# Patient Record
Sex: FEMALE | Race: WHITE | NOT HISPANIC OR LATINO | Employment: FULL TIME | URBAN - NONMETROPOLITAN AREA
[De-identification: names, ages, dates, MRNs, and addresses within clinical notes are randomized per-mention and may not be internally consistent; named-entity substitution may affect disease eponyms.]

---

## 2020-10-03 ENCOUNTER — APPOINTMENT (EMERGENCY)
Dept: RADIOLOGY | Facility: HOSPITAL | Age: 55
End: 2020-10-03
Payer: COMMERCIAL

## 2020-10-03 ENCOUNTER — HOSPITAL ENCOUNTER (EMERGENCY)
Facility: HOSPITAL | Age: 55
Discharge: HOME | End: 2020-10-03
Attending: EMERGENCY MEDICINE | Admitting: EMERGENCY MEDICINE
Payer: COMMERCIAL

## 2020-10-03 VITALS
WEIGHT: 246 LBS | OXYGEN SATURATION: 99 % | SYSTOLIC BLOOD PRESSURE: 174 MMHG | RESPIRATION RATE: 18 BRPM | DIASTOLIC BLOOD PRESSURE: 83 MMHG | TEMPERATURE: 97.7 F | HEART RATE: 86 BPM
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
## 2020-10-03 DIAGNOSIS — S22.31XA FRACTURE OF ONE RIB OF RIGHT SIDE: ICD-10-CM

## 2020-10-03 PROCEDURE — 99284 EMERGENCY DEPT VISIT MOD MDM: CPT | Performed by: EMERGENCY MEDICINE

## 2020-10-03 PROCEDURE — 99283 EMERGENCY DEPT VISIT LOW MDM: CPT

## 2020-10-03 PROCEDURE — 96374 THER/PROPH/DIAG INJ IV PUSH: CPT

## 2020-10-03 PROCEDURE — 71046 X-RAY EXAM CHEST 2 VIEWS: CPT

## 2020-10-03 PROCEDURE — 72100 X-RAY EXAM L-S SPINE 2/3 VWS: CPT

## 2020-10-03 RX ORDER — OXYCODONE HYDROCHLORIDE AND ACETAMINOPHEN 5; 325 MG/1; MG/1
1 TABLET ORAL EVERY 6 HOURS PRN
Qty: 15 TABLET | Refills: 0 | Status: SHIPPED | OUTPATIENT
Start: 2020-10-03 | End: 2020-10-13

## 2020-10-03 RX ORDER — OXYCODONE HYDROCHLORIDE AND ACETAMINOPHEN 5; 325 MG/1; MG/1
1 TABLET ORAL ONCE
Status: COMPLETED | OUTPATIENT
Start: 2020-10-03 | End: 2020-10-03

## 2020-10-03 RX ORDER — KETOROLAC TROMETHAMINE 30 MG/ML
15 INJECTION, SOLUTION INTRAMUSCULAR; INTRAVENOUS ONCE
Status: COMPLETED | OUTPATIENT
Start: 2020-10-03 | End: 2020-10-03

## 2020-10-03 RX ADMIN — OXYCODONE HYDROCHLORIDE AND ACETAMINOPHEN 1 TABLET: 5; 325 TABLET ORAL at 21:26

## 2020-10-03 RX ADMIN — KETOROLAC TROMETHAMINE 15 MG: 30 INJECTION, SOLUTION INTRAMUSCULAR at 20:27

## 2021-03-02 ENCOUNTER — TRANSCRIBE ORDERS (OUTPATIENT)
Dept: ADMINISTRATIVE | Facility: HOSPITAL | Age: 56
End: 2021-03-02

## 2021-03-02 DIAGNOSIS — M79.662 PAIN IN LEFT LOWER LEG: ICD-10-CM

## 2021-03-02 DIAGNOSIS — I80.202: Primary | ICD-10-CM

## 2021-03-03 ENCOUNTER — APPOINTMENT (EMERGENCY)
Dept: RADIOLOGY | Facility: HOSPITAL | Age: 56
End: 2021-03-03
Payer: COMMERCIAL

## 2021-03-03 ENCOUNTER — APPOINTMENT (EMERGENCY)
Dept: CT IMAGING | Facility: HOSPITAL | Age: 56
End: 2021-03-03
Payer: COMMERCIAL

## 2021-03-03 ENCOUNTER — APPOINTMENT (OUTPATIENT)
Dept: NON INVASIVE DIAGNOSTICS | Facility: HOSPITAL | Age: 56
End: 2021-03-03
Payer: COMMERCIAL

## 2021-03-03 ENCOUNTER — HOSPITAL ENCOUNTER (OUTPATIENT)
Dept: NON INVASIVE DIAGNOSTICS | Facility: HOSPITAL | Age: 56
Discharge: HOME | End: 2021-03-03
Payer: COMMERCIAL

## 2021-03-03 ENCOUNTER — HOSPITAL ENCOUNTER (OUTPATIENT)
Facility: HOSPITAL | Age: 56
Setting detail: OBSERVATION
Discharge: HOME | End: 2021-03-04
Attending: EMERGENCY MEDICINE | Admitting: FAMILY MEDICINE
Payer: COMMERCIAL

## 2021-03-03 DIAGNOSIS — I80.202: ICD-10-CM

## 2021-03-03 DIAGNOSIS — I80.02 THROMBOPHLEBITIS OF SUPERFICIAL VEINS OF LEFT LOWER EXTREMITY: ICD-10-CM

## 2021-03-03 DIAGNOSIS — I26.99 ACUTE PULMONARY EMBOLISM WITHOUT ACUTE COR PULMONALE, UNSPECIFIED PULMONARY EMBOLISM TYPE (HCC): ICD-10-CM

## 2021-03-03 DIAGNOSIS — M79.605 LEFT LEG PAIN: Primary | ICD-10-CM

## 2021-03-03 DIAGNOSIS — M79.662 PAIN IN LEFT LOWER LEG: ICD-10-CM

## 2021-03-03 DIAGNOSIS — I82.90 OCCLUSIVE THROMBUS: ICD-10-CM

## 2021-03-03 DIAGNOSIS — I26.99 PULMONARY EMBOLISM (HCC): ICD-10-CM

## 2021-03-03 PROBLEM — E66.01 CLASS 2 SEVERE OBESITY DUE TO EXCESS CALORIES WITH SERIOUS COMORBIDITY IN ADULT (HCC): Status: ACTIVE | Noted: 2021-03-03

## 2021-03-03 PROBLEM — I83.812 VARICOSE VEINS OF LEFT LOWER EXTREMITY WITH PAIN: Status: ACTIVE | Noted: 2021-03-03

## 2021-03-03 PROBLEM — Z98.84 H/O BARIATRIC SURGERY: Status: ACTIVE | Noted: 2021-03-03

## 2021-03-03 PROBLEM — E66.812 CLASS 2 SEVERE OBESITY DUE TO EXCESS CALORIES WITH SERIOUS COMORBIDITY IN ADULT (HCC): Status: ACTIVE | Noted: 2021-03-03

## 2021-03-03 PROBLEM — R06.02 SHORTNESS OF BREATH: Status: ACTIVE | Noted: 2021-03-03

## 2021-03-03 LAB
ALBUMIN SERPL BCP-MCNC: 3.5 G/DL (ref 3.5–5)
ALP SERPL-CCNC: 67 U/L (ref 46–116)
ALT SERPL W P-5'-P-CCNC: 18 U/L (ref 12–78)
ANION GAP SERPL CALCULATED.3IONS-SCNC: 4 MMOL/L (ref 4–13)
APTT PPP: 176 SECONDS (ref 23–37)
APTT PPP: 27 SECONDS (ref 23–37)
AST SERPL W P-5'-P-CCNC: 12 U/L (ref 5–45)
BASOPHILS # BLD AUTO: 0.06 THOUSANDS/ΜL (ref 0–0.1)
BASOPHILS NFR BLD AUTO: 1 % (ref 0–1)
BILIRUB SERPL-MCNC: 0.73 MG/DL (ref 0.2–1)
BUN SERPL-MCNC: 18 MG/DL (ref 5–25)
CALCIUM SERPL-MCNC: 8.9 MG/DL (ref 8.3–10.1)
CHLORIDE SERPL-SCNC: 103 MMOL/L (ref 100–108)
CO2 SERPL-SCNC: 33 MMOL/L (ref 21–32)
CREAT SERPL-MCNC: 0.79 MG/DL (ref 0.6–1.3)
D DIMER PPP FEU-MCNC: 4.65 UG/ML FEU
DEPRECATED AT III PPP: 99 % OF NORMAL (ref 92–136)
EOSINOPHIL # BLD AUTO: 0.19 THOUSAND/ΜL (ref 0–0.61)
EOSINOPHIL NFR BLD AUTO: 2 % (ref 0–6)
ERYTHROCYTE [DISTWIDTH] IN BLOOD BY AUTOMATED COUNT: 13.2 % (ref 11.6–15.1)
GFR SERPL CREATININE-BSD FRML MDRD: 85 ML/MIN/1.73SQ M
GLUCOSE SERPL-MCNC: 154 MG/DL (ref 65–140)
HCT VFR BLD AUTO: 40.2 % (ref 34.8–46.1)
HGB BLD-MCNC: 13.2 G/DL (ref 11.5–15.4)
IMM GRANULOCYTES # BLD AUTO: 0.03 THOUSAND/UL (ref 0–0.2)
IMM GRANULOCYTES NFR BLD AUTO: 0 % (ref 0–2)
INR PPP: 1.02 (ref 0.84–1.19)
LYMPHOCYTES # BLD AUTO: 2.68 THOUSANDS/ΜL (ref 0.6–4.47)
LYMPHOCYTES NFR BLD AUTO: 33 % (ref 14–44)
MCH RBC QN AUTO: 29.9 PG (ref 26.8–34.3)
MCHC RBC AUTO-ENTMCNC: 32.8 G/DL (ref 31.4–37.4)
MCV RBC AUTO: 91 FL (ref 82–98)
MONOCYTES # BLD AUTO: 0.6 THOUSAND/ΜL (ref 0.17–1.22)
MONOCYTES NFR BLD AUTO: 7 % (ref 4–12)
NEUTROPHILS # BLD AUTO: 4.62 THOUSANDS/ΜL (ref 1.85–7.62)
NEUTS SEG NFR BLD AUTO: 57 % (ref 43–75)
NRBC BLD AUTO-RTO: 0 /100 WBCS
PLATELET # BLD AUTO: 298 THOUSANDS/UL (ref 149–390)
PMV BLD AUTO: 9.6 FL (ref 8.9–12.7)
POTASSIUM SERPL-SCNC: 4.3 MMOL/L (ref 3.5–5.3)
PROT SERPL-MCNC: 7.3 G/DL (ref 6.4–8.2)
PROTHROMBIN TIME: 13.2 SECONDS (ref 11.6–14.5)
RBC # BLD AUTO: 4.41 MILLION/UL (ref 3.81–5.12)
SODIUM SERPL-SCNC: 140 MMOL/L (ref 136–145)
TROPONIN I SERPL-MCNC: <0.02 NG/ML
TSH SERPL DL<=0.05 MIU/L-ACNC: 2.35 UIU/ML (ref 0.36–3.74)
WBC # BLD AUTO: 8.18 THOUSAND/UL (ref 4.31–10.16)

## 2021-03-03 PROCEDURE — 86147 CARDIOLIPIN ANTIBODY EA IG: CPT | Performed by: EMERGENCY MEDICINE

## 2021-03-03 PROCEDURE — 85730 THROMBOPLASTIN TIME PARTIAL: CPT | Performed by: FAMILY MEDICINE

## 2021-03-03 PROCEDURE — 94760 N-INVAS EAR/PLS OXIMETRY 1: CPT

## 2021-03-03 PROCEDURE — 84443 ASSAY THYROID STIM HORMONE: CPT | Performed by: FAMILY MEDICINE

## 2021-03-03 PROCEDURE — 94664 DEMO&/EVAL PT USE INHALER: CPT

## 2021-03-03 PROCEDURE — 85670 THROMBIN TIME PLASMA: CPT | Performed by: EMERGENCY MEDICINE

## 2021-03-03 PROCEDURE — 85379 FIBRIN DEGRADATION QUANT: CPT | Performed by: EMERGENCY MEDICINE

## 2021-03-03 PROCEDURE — 81241 F5 GENE: CPT | Performed by: EMERGENCY MEDICINE

## 2021-03-03 PROCEDURE — 85306 CLOT INHIBIT PROT S FREE: CPT | Performed by: EMERGENCY MEDICINE

## 2021-03-03 PROCEDURE — 85732 THROMBOPLASTIN TIME PARTIAL: CPT | Performed by: EMERGENCY MEDICINE

## 2021-03-03 PROCEDURE — 86146 BETA-2 GLYCOPROTEIN ANTIBODY: CPT | Performed by: EMERGENCY MEDICINE

## 2021-03-03 PROCEDURE — 85610 PROTHROMBIN TIME: CPT | Performed by: FAMILY MEDICINE

## 2021-03-03 PROCEDURE — 93971 EXTREMITY STUDY: CPT

## 2021-03-03 PROCEDURE — 93005 ELECTROCARDIOGRAM TRACING: CPT

## 2021-03-03 PROCEDURE — 85305 CLOT INHIBIT PROT S TOTAL: CPT | Performed by: EMERGENCY MEDICINE

## 2021-03-03 PROCEDURE — 99285 EMERGENCY DEPT VISIT HI MDM: CPT | Performed by: EMERGENCY MEDICINE

## 2021-03-03 PROCEDURE — 80053 COMPREHEN METABOLIC PANEL: CPT

## 2021-03-03 PROCEDURE — 71275 CT ANGIOGRAPHY CHEST: CPT

## 2021-03-03 PROCEDURE — 93971 EXTREMITY STUDY: CPT | Performed by: SURGERY

## 2021-03-03 PROCEDURE — 71045 X-RAY EXAM CHEST 1 VIEW: CPT

## 2021-03-03 PROCEDURE — 93306 TTE W/DOPPLER COMPLETE: CPT | Performed by: INTERNAL MEDICINE

## 2021-03-03 PROCEDURE — 84484 ASSAY OF TROPONIN QUANT: CPT

## 2021-03-03 PROCEDURE — 85705 THROMBOPLASTIN INHIBITION: CPT | Performed by: EMERGENCY MEDICINE

## 2021-03-03 PROCEDURE — 85025 COMPLETE CBC W/AUTO DIFF WBC: CPT

## 2021-03-03 PROCEDURE — 36415 COLL VENOUS BLD VENIPUNCTURE: CPT

## 2021-03-03 PROCEDURE — 99220 PR INITIAL OBSERVATION CARE/DAY 70 MINUTES: CPT | Performed by: FAMILY MEDICINE

## 2021-03-03 PROCEDURE — 85303 CLOT INHIBIT PROT C ACTIVITY: CPT | Performed by: EMERGENCY MEDICINE

## 2021-03-03 PROCEDURE — 81240 F2 GENE: CPT | Performed by: EMERGENCY MEDICINE

## 2021-03-03 PROCEDURE — 85300 ANTITHROMBIN III ACTIVITY: CPT | Performed by: EMERGENCY MEDICINE

## 2021-03-03 PROCEDURE — 85613 RUSSELL VIPER VENOM DILUTED: CPT | Performed by: EMERGENCY MEDICINE

## 2021-03-03 PROCEDURE — 99285 EMERGENCY DEPT VISIT HI MDM: CPT

## 2021-03-03 PROCEDURE — 93306 TTE W/DOPPLER COMPLETE: CPT

## 2021-03-03 RX ORDER — CALCIUM CARBONATE 200(500)MG
1000 TABLET,CHEWABLE ORAL DAILY PRN
Status: DISCONTINUED | OUTPATIENT
Start: 2021-03-03 | End: 2021-03-04 | Stop reason: HOSPADM

## 2021-03-03 RX ORDER — ONDANSETRON 2 MG/ML
4 INJECTION INTRAMUSCULAR; INTRAVENOUS EVERY 6 HOURS PRN
Status: DISCONTINUED | OUTPATIENT
Start: 2021-03-03 | End: 2021-03-04 | Stop reason: HOSPADM

## 2021-03-03 RX ORDER — FEXOFENADINE HCL 180 MG/1
180 TABLET ORAL
COMMUNITY

## 2021-03-03 RX ORDER — POLYETHYLENE GLYCOL 3350 17 G/17G
17 POWDER, FOR SOLUTION ORAL DAILY
Status: DISCONTINUED | OUTPATIENT
Start: 2021-03-03 | End: 2021-03-04 | Stop reason: HOSPADM

## 2021-03-03 RX ORDER — ERGOCALCIFEROL 1.25 MG/1
CAPSULE ORAL
COMMUNITY
Start: 2021-02-05

## 2021-03-03 RX ORDER — SENNOSIDES 8.6 MG
1 TABLET ORAL DAILY
Status: DISCONTINUED | OUTPATIENT
Start: 2021-03-03 | End: 2021-03-04 | Stop reason: HOSPADM

## 2021-03-03 RX ORDER — ACETAMINOPHEN 325 MG/1
650 TABLET ORAL EVERY 6 HOURS PRN
Status: DISCONTINUED | OUTPATIENT
Start: 2021-03-03 | End: 2021-03-04 | Stop reason: HOSPADM

## 2021-03-03 RX ORDER — HEPARIN SODIUM 10000 [USP'U]/100ML
3-30 INJECTION, SOLUTION INTRAVENOUS
Status: DISCONTINUED | OUTPATIENT
Start: 2021-03-03 | End: 2021-03-04

## 2021-03-03 RX ORDER — BIOTIN 10 MG
2 TABLET ORAL
COMMUNITY

## 2021-03-03 RX ORDER — HEPARIN SODIUM 1000 [USP'U]/ML
8800 INJECTION, SOLUTION INTRAVENOUS; SUBCUTANEOUS ONCE
Status: COMPLETED | OUTPATIENT
Start: 2021-03-03 | End: 2021-03-03

## 2021-03-03 RX ADMIN — IOHEXOL 100 ML: 350 INJECTION, SOLUTION INTRAVENOUS at 10:50

## 2021-03-03 RX ADMIN — HEPARIN SODIUM 18 UNITS/KG/HR: 10000 INJECTION, SOLUTION INTRAVENOUS at 11:55

## 2021-03-03 RX ADMIN — HEPARIN SODIUM 8800 UNITS: 1000 INJECTION INTRAVENOUS; SUBCUTANEOUS at 11:55

## 2021-03-03 NOTE — ED PROVIDER NOTES
History  Chief Complaint   Patient presents with    Leg Pain     pt c/o increased left leg pain, redness, and swelling from knee to calf for past week and developed sob over past couple days  hx DVT  denies travel/fevers/cough     52-year-old female to the emergency room complaining of shortness of breath for the last couple days  Patient reports that for about a week now she has been experiencing left leg pain, redness, swelling which is also been getting increasingly worse  Patient has a history of DVT secondary to previous pregnancies and had previously been on anticoagulation therapy  Not currently  Patient became more concerned when she started to feel short of breath today  No chest pressure  History provided by:  Patient  Leg Pain  Location:  Leg  Time since incident:  5 days  Leg location:  L leg and L lower leg  Pain details:     Quality:  Aching, cramping and shooting    Radiates to:  L leg    Severity:  Moderate    Onset quality:  Gradual    Timing:  Constant    Progression:  Worsening  Chronicity:  New  Associated symptoms: no back pain and no fever    Shortness of Breath  Severity:  Moderate  Onset quality:  Gradual  Duration:  2 days  Timing:  Intermittent  Progression:  Worsening  Chronicity:  New  Context: activity    Worsened by: Activity, deep breathing, exertion and stress  Ineffective treatments:  None tried  Associated symptoms: no abdominal pain, no chest pain, no cough, no diaphoresis, no ear pain, no fever, no headaches, no rash, no sore throat, no vomiting and no wheezing        Prior to Admission Medications   Prescriptions Last Dose Informant Patient Reported? Taking?    Multiple Vitamins-Minerals (Multivitamin Adult Extra C) CHEW 3/2/2021 at Unknown time  Yes Yes   Sig: Chew 2 each   ergocalciferol (VITAMIN D2) 50,000 units Past Week at Unknown time  Yes Yes   Sig: TAKE 2 CAPSULES BY MOUTH ONCE A WEEK   fexofenadine (ALLEGRA) 180 MG tablet 3/2/2021 at Unknown time  Yes Yes Sig: Take 180 mg by mouth      Facility-Administered Medications: None       Past Medical History:   Diagnosis Date    DVT (deep vein thrombosis) in pregnancy        Past Surgical History:   Procedure Laterality Date    ABDOMINAL SURGERY      Gastric Sleeve      SECTION         History reviewed  No pertinent family history  I have reviewed and agree with the history as documented  E-Cigarette/Vaping    E-Cigarette Use Never User      E-Cigarette/Vaping Substances     Social History     Tobacco Use    Smoking status: Former Smoker     Types: Cigarettes     Quit date:      Years since quittin 1    Smokeless tobacco: Never Used   Substance Use Topics    Alcohol use: Never     Frequency: Never    Drug use: Never       Review of Systems   Constitutional: Negative for activity change, diaphoresis and fever  HENT: Negative for congestion, ear pain, rhinorrhea, sinus pressure and sore throat  Eyes: Negative  Respiratory: Positive for shortness of breath  Negative for cough, chest tightness and wheezing  Cardiovascular: Negative for chest pain, palpitations and leg swelling  Gastrointestinal: Negative for abdominal pain, diarrhea, nausea and vomiting  Endocrine: Negative  Genitourinary: Negative for decreased urine volume, dysuria and flank pain  Musculoskeletal: Negative for arthralgias, back pain and myalgias  Skin: Negative for color change, pallor and rash  Allergic/Immunologic: Negative  Neurological: Negative for dizziness, weakness and headaches  Hematological: Negative  Psychiatric/Behavioral: Negative  All other systems reviewed and are negative  Physical Exam  Physical Exam  Vitals signs and nursing note reviewed  Constitutional:       General: She is not in acute distress  Appearance: She is well-developed  She is obese  She is not ill-appearing or toxic-appearing  HENT:      Head: Normocephalic and atraumatic        Right Ear: External ear normal       Left Ear: External ear normal       Nose: Nose normal  No congestion  Mouth/Throat:      Mouth: Mucous membranes are moist       Pharynx: Oropharynx is clear  No oropharyngeal exudate  Eyes:      Pupils: Pupils are equal, round, and reactive to light  Neck:      Musculoskeletal: Normal range of motion and neck supple  Cardiovascular:      Rate and Rhythm: Normal rate and regular rhythm  Heart sounds: Normal heart sounds  No murmur  Pulmonary:      Effort: Pulmonary effort is normal  No respiratory distress  Breath sounds: Normal breath sounds  No stridor  No wheezing or rales  Chest:      Chest wall: No tenderness  Abdominal:      General: Abdomen is flat  There is no distension  Palpations: Abdomen is soft  Tenderness: There is no abdominal tenderness  There is no right CVA tenderness, left CVA tenderness, guarding or rebound  Musculoskeletal: Normal range of motion  General: No deformity  Right lower leg: Edema present  Left lower leg: Edema present  Comments: Bilateral left greater than right varicosities  Bilateral nonpitting edema  Skin:     General: Skin is warm and dry  Coloration: Skin is not pale  Findings: No rash  Neurological:      General: No focal deficit present  Mental Status: She is alert and oriented to person, place, and time  Mental status is at baseline  Cranial Nerves: No cranial nerve deficit  Psychiatric:         Mood and Affect: Mood normal          Thought Content:  Thought content normal          Judgment: Judgment normal          Vital Signs  ED Triage Vitals [03/03/21 1014]   Temperature Pulse Respirations Blood Pressure SpO2   98 7 °F (37 1 °C) 74 17 132/76 100 %      Temp Source Heart Rate Source Patient Position - Orthostatic VS BP Location FiO2 (%)   Temporal Monitor Lying Left arm --      Pain Score       --           Vitals:    03/03/21 1014 03/03/21 1045 03/03/21 1130   BP: 132/76 118/64 127/78   Pulse: 74 69 68   Patient Position - Orthostatic VS: Lying           Visual Acuity      ED Medications  Medications   heparin (porcine) injection 8,800 Units (has no administration in time range)   heparin (porcine) 25,000 units in 0 45% NaCl 250 mL infusion (premix) (has no administration in time range)   iohexol (OMNIPAQUE) 350 MG/ML injection (SINGLE-DOSE) 100 mL (100 mL Intravenous Given 3/3/21 1050)       Diagnostic Studies  Results Reviewed     Procedure Component Value Units Date/Time    D-dimer, quantitative [[id number]]  (Abnormal) Collected: 03/03/21 1020    Lab Status: Final result Specimen: Blood from Arm, Right Updated: 03/03/21 1132     D-Dimer, Quant 4 65 ug/ml FEU     Troponin I [[id number]]  (Normal) Collected: 03/03/21 1020    Lab Status: Final result Specimen: Blood from Arm, Right Updated: 03/03/21 1047     Troponin I <0 02 ng/mL     Comprehensive metabolic panel [[id number]]  (Abnormal) Collected: 03/03/21 1020    Lab Status: Final result Specimen: Blood from Arm, Right Updated: 03/03/21 1044     Sodium 140 mmol/L      Potassium 4 3 mmol/L      Chloride 103 mmol/L      CO2 33 mmol/L      ANION GAP 4 mmol/L      BUN 18 mg/dL      Creatinine 0 79 mg/dL      Glucose 154 mg/dL      Calcium 8 9 mg/dL      AST 12 U/L      ALT 18 U/L      Alkaline Phosphatase 67 U/L      Total Protein 7 3 g/dL      Albumin 3 5 g/dL      Total Bilirubin 0 73 mg/dL      eGFR 85 ml/min/1 73sq m     Narrative:      [facility] guidelines for Chronic Kidney Disease (CKD):     Stage 1 with normal or high GFR (GFR > 90 mL/min/1 73 square meters)    Stage 2 Mild CKD (GFR = 60-89 mL/min/1 73 square meters)    Stage 3A Moderate CKD (GFR = 45-59 mL/min/1 73 square meters)    Stage 3B Moderate CKD (GFR = 30-44 mL/min/1 73 square meters)    Stage 4 Severe CKD (GFR = 15-29 mL/min/1 73 square meters)    Stage 5 End Stage CKD (GFR <15 mL/min/1 73 square meters)  Note: GFR calculation is accurate only with a steady state creatinine    CBC and differential [[id number]] Collected: 03/03/21 1020    Lab Status: Final result Specimen: Blood from Arm, Right Updated: 03/03/21 1026     WBC 8 18 Thousand/uL      RBC 4 41 Million/uL      Hemoglobin 13 2 g/dL      Hematocrit 40 2 %      MCV 91 fL      MCH 29 9 pg      MCHC 32 8 g/dL      RDW 13 2 %      MPV 9 6 fL      Platelets 077 Thousands/uL      nRBC 0 /100 WBCs      Neutrophils Relative 57 %      Immat GRANS % 0 %      Lymphocytes Relative 33 %      Monocytes Relative 7 %      Eosinophils Relative 2 %      Basophils Relative 1 %      Neutrophils Absolute 4 62 Thousands/µL      Immature Grans Absolute 0 03 Thousand/uL      Lymphocytes Absolute 2 68 Thousands/µL      Monocytes Absolute 0 60 Thousand/µL      Eosinophils Absolute 0 19 Thousand/µL      Basophils Absolute 0 06 Thousands/µL                  CTA ED chest PE Study   Final Result by Brittny Ley MD (03/03 1119)      Few small right-sided pulmonary emboli  Measured RV/LV ratio is within normal limits at less than 0 9  Clear lungs  I personally discussed this study with Randa Marte on 3/3/2021 at 11:13 AM                Workstation performed: [id number]         XR chest 1 view portable   Final Result by Jeanmarie Millard MD (03/03 1119)   No acute cardiopulmonary disease        Findings are stable            Workstation performed: [id number]                    Procedures  ECG 12 Lead Documentation Only    Date/Time: 3/3/2021 11:27 AM  Performed by: Mecca Doty DO  Authorized by: Mecca Doty DO     Indications / Diagnosis:  Shortness of breath  Patient location:  ED  Previous ECG:     Previous ECG:  Unavailable  Interpretation:     Interpretation: normal    Rate:     ECG rate assessment: normal    Rhythm:     Rhythm: sinus rhythm    Ectopy:     Ectopy: none    QRS:     QRS axis:  Normal  Conduction:     Conduction: normal    ST segments:     ST segments: Normal             ED Course                             SBIRT 20yo+      Most Recent Value   SBIRT (22 yo +)   In order to provide better care to our patients, we are screening all of our patients for alcohol and drug use  Would it be okay to ask you these screening questions? Yes Filed at: 03/03/2021 1034   Initial Alcohol Screen: US AUDIT-C    1  How often do you have a drink containing alcohol?  0 Filed at: 03/03/2021 1034   2  How many drinks containing alcohol do you have on a typical day you are drinking? 0 Filed at: 03/03/2021 1034   3b  FEMALE Any Age, or MALE 65+: How often do you have 4 or more drinks on one occassion? 0 Filed at: 03/03/2021 1034   Audit-C Score  0 Filed at: 03/03/2021 1034   OLAYINKA: How many times in the past year have you    Used an illegal drug or used a prescription medication for non-medical reasons? Never Filed at: 03/03/2021 1034          Wells' Criteria for PE      Most Recent Value   Wells' Criteria for PE   Clinical signs and symptoms of DVT  3 Filed at: 03/03/2021 1040   PE is primary diagnosis or equally likely  3 Filed at: 03/03/2021 1040   HR >100  0 Filed at: 03/03/2021 1040   Immobilization at least 3 days or Surgery in the previous 4 weeks  0 Filed at: 03/03/2021 1040   Previous, objectively diagnosed PE or DVT  1 5 Filed at: 03/03/2021 1040   Hemoptysis  0 Filed at: 03/03/2021 1040   Malignancy with treatment within 6 months or palliative  0 Filed at: 03/03/2021 1040   Wells' Criteria Total  7 5 Filed at: 03/03/2021 1040                MDM  Number of Diagnoses or Management Options  Left leg pain: new and requires workup  Pulmonary embolism ([address] ): new and requires workup  Thrombophlebitis of superficial veins of left lower extremity: new and requires workup  Diagnosis management comments: Patient presented to the emergency department and a MSE was performed   The patient was evaluated and diagnosed with acute left leg pain with findings of superficial thrombophlebitis and subsequently pulmonary embolism  Patient has a history of DVT with pregnancies and had previously been on anticoagulation many years ago  None currently  This is a new issue that will require additional planned work-up and treatment in a hospitalized setting  As may have been required as part of this evaluation, clinical laboratory test, radiology imaging and medical testing (I e  EKG) were ordered as necessitated by the patient's presentation  I independently reviewed these studies, imaging and testing  This patient's case is considered to be a considerable risk secondary to the above listed disease process and poses a threat to the patient's well-being and baseline function  Further in-patient diagnostic testing and management, which may include the administration of parenteral medications, is required           Amount and/or Complexity of Data Reviewed  Clinical lab tests: ordered and reviewed  Tests in the radiology section of CPT®: ordered and reviewed  Discussion of test results with the performing providers: yes    Risk of Complications, Morbidity, and/or Mortality  Presenting problems: high  Diagnostic procedures: moderate  Management options: moderate    Patient Progress  Patient progress: stable      Disposition  Final diagnoses:   Left leg pain   Thrombophlebitis of superficial veins of left lower extremity   Pulmonary embolism ([address] )     Time reflects when diagnosis was documented in both MDM as applicable and the Disposition within this note     Time User Action Codes Description Comment    3/3/2021 11:32 AM Jl Gaviria Add [M79 605] Left leg pain     3/3/2021 11:33 AM Kristy Shook Add [I80 02] Thrombophlebitis of superficial veins of left lower extremity     3/3/2021 11:33 AM Jl Gaviria Add [I26 99] Pulmonary embolism [facility])       ED Disposition     ED Disposition Condition Date/Time Comment    Admit Stable Wed Mar 3, 2021 11:32 AM       Follow-up Information    None Patient's Medications   Discharge Prescriptions    No medications on file     No discharge procedures on file      PDMP Review     None          ED Provider  Electronically Signed by           Sara Pierre,   03/03/21 1294

## 2021-03-03 NOTE — ASSESSMENT & PLAN NOTE
Most likely secondary to pulmonary embolism  Continue treatment as per protocol with heparin drip  Follow echocardiogram  Follow respiratory protocol

## 2021-03-03 NOTE — ASSESSMENT & PLAN NOTE
Lifestyle modification  Patient had history of gastric sleeve surgery in 2017  Low-fat, low-salt diet follow nutritional consult

## 2021-03-03 NOTE — RESPIRATORY THERAPY NOTE
RT Protocol Note  Jace Smiley 55 y o  female MRN: [id number]  Unit/Bed#: -01 Encounter: [id number]    Assessment    Principal Problem:    Acute pulmonary embolism without acute cor pulmonale (HCC)  Active Problems:    Shortness of breath    Class 2 severe obesity due to excess calories with serious comorbidity in adult [facility])    Occlusive thrombus    H/O bariatric surgery    Varicose veins of left lower extremity with pain      Home Pulmonary Medications:         Past Medical History:   Diagnosis Date    DVT (deep vein thrombosis) in pregnancy      Social History     Socioeconomic History    Marital status: /Civil Union     Spouse name: None    Number of children: None    Years of education: None    Highest education level: None   Occupational History    None   Social Needs    Financial resource strain: None    Food insecurity     Worry: None     Inability: None    Transportation needs     Medical: None     Non-medical: None   Tobacco Use    Smoking status: Former Smoker     Types: Cigarettes     Quit date:      Years since quittin 1    Smokeless tobacco: Never Used   Substance and Sexual Activity    Alcohol use: Never     Frequency: Never    Drug use: Never    Sexual activity: None   Lifestyle    Physical activity     Days per week: None     Minutes per session: None    Stress: None   Relationships    Social connections     Talks on phone: None     Gets together: None     Attends Gnosticist service: None     Active member of club or organization: None     Attends meetings of clubs or organizations: None     Relationship status: None    Intimate partner violence     Fear of current or ex partner: None     Emotionally abused: None     Physically abused: None     Forced sexual activity: None   Other Topics Concern    None   Social History Narrative    None       Subjective         Objective    Physical Exam:   Assessment Type: Assess only  General Appearance: Awake  Respiratory Pattern: Symmetrical, Normal  Chest Assessment: Chest expansion symmetrical  Bilateral Breath Sounds: Clear  O2 Device: room air    Vitals:  Blood pressure 134/75, pulse 76, temperature 98 1 °F (36 7 °C), temperature source Oral, resp  rate 16, height 5' 7" (1 702 m), weight 113 kg (248 lb 0 3 oz), SpO2 95 %  Imaging and other studies: I have personally reviewed pertinent reports  O2 Device: room air     Plan    Respiratory Plan: (P) Discontinue Protocol        Resp Comments: Pt admitted with SOB and leg swelling and redness  Chest xray shows right sided PE  Pt is stable on room air with no use of respiratory medication  clear BS bilaterally

## 2021-03-03 NOTE — ASSESSMENT & PLAN NOTE
CTA chest shows few small right-sided pulmonary embolism, RV/LV ratio normal,  Venous duplex shows:Acute superficial thrombophlebitis at the SFJ (non-occlusive) and in the entireGSV  Occlusive thrombus in the GSV is 1 02 cm from the CFV  Presented with shortness of breath  Patient also has previous history DVT while she was pregnant, and patient has risk factor obesity-and sedentary lifestyle (works 12 hour shift on sitting position)  Hemodynamically remained stable  Patient is not requiring any oxygen  Follow echocardiogram  Continue heparin drip for next 24 hours, then consider to switch to p o   Anticoagulation  Monitor for signs symptoms of bleeding

## 2021-03-03 NOTE — ASSESSMENT & PLAN NOTE
Venous duplex shows:Acute superficial thrombophlebitis at the SFJ (non-occlusive) and in the entireGSV  Occlusive thrombus in the GSV is 1 02 cm from the CFV    Discussed the case via tiger text with on-call vascular surgery-recommends to continue medical management

## 2021-03-03 NOTE — H&P
H&P- Herrera Brendakit 1965, 55 y o  female MRN: [id number]    Unit/Bed#: -01 Encounter: [id number]    Primary Care Provider: Suleiman Xiong DO   Date and time admitted to hospital: 3/3/2021 10:09 AM        * Acute pulmonary embolism without acute cor pulmonale (HCC)  Assessment & Plan  CTA chest shows few small right-sided pulmonary embolism, RV/LV ratio normal,  Venous duplex shows:Acute superficial thrombophlebitis at the SFJ (non-occlusive) and in the entireGSV  Occlusive thrombus in the GSV is 1 02 cm from the CFV  Presented with shortness of breath  Patient also has previous history DVT while she was pregnant, and patient has risk factor obesity-and sedentary lifestyle (works 12 hour shift on sitting position)  Hemodynamically remained stable  Patient is not requiring any oxygen  Follow echocardiogram  Continue heparin drip for next 24 hours, then consider to switch to p o  Anticoagulation  Monitor for signs symptoms of bleeding    Varicose veins of left lower extremity with pain  Assessment & Plan  Affecting left lower extremities  May use Tylenol    H/O bariatric surgery  Assessment & Plan  History of gastric sleeve surgery in 2017      Occlusive thrombus  Assessment & Plan  Venous duplex shows:Acute superficial thrombophlebitis at the SFJ (non-occlusive) and in the entireGSV  Occlusive thrombus in the GSV is 1 02 cm from the CFV    Discussed the case via tiger text with on-call vascular surgery-recommends to continue medical management    Class 2 severe obesity due to excess calories with serious comorbidity in adult [facility])  Assessment & Plan  Lifestyle modification  Patient had history of gastric sleeve surgery in 2017  Low-fat, low-salt diet follow nutritional consult    Shortness of breath  Assessment & Plan  Most likely secondary to pulmonary embolism  Continue treatment as per protocol with heparin drip  Follow echocardiogram  Follow respiratory protocol    VTE Prophylaxis: Heparin Drip  / sequential compression device   Code Status:  Full code      Anticipated Length of Stay:  Patient will be admitted on an Observation basis with an anticipated length of stay of  < 2 midnights  Justification for Hospital Stay:  To monitor above conditions    Total Time for Visit, including Counseling / Coordination of Care: 45 minutes  Greater than 50% of this total time spent on direct patient counseling and coordination of care  Chief Complaint:   Shortness of breath    History of Present Illness:    Neil Bailey is a 55 y o  female who presents with leg pain and shortness of breath  As per patient, for last 1 week, she was having pain in the left lower extremities which was getting worse, then later on she was started having short of breath especially she when she was awake  Denies any bloody sputum, fever, runny nose, chest pain, dizziness, lightheadedness  Does not smoke or drink alcohol  Patient reports she had history of gastric sleeve surgery in 2017  Also had history of DVT while she was pregnant, received heparin short  Patient reports she works 12 hour shift, sitting position at home  Denies any recent traveling, any recent surgery  But patient does have varicose vein affecting lower extremities    Review of Systems:    Review of Systems   Constitutional: Positive for activity change  Negative for appetite change, chills, diaphoresis, fatigue, fever and unexpected weight change  HENT: Negative for congestion  Respiratory: Positive for shortness of breath  Negative for apnea, cough, wheezing and stridor  Cardiovascular: Positive for leg swelling  Negative for chest pain and palpitations  Gastrointestinal: Negative for abdominal distention, abdominal pain, diarrhea, nausea, rectal pain and vomiting  Genitourinary: Negative for difficulty urinating and dyspareunia  Musculoskeletal: Positive for arthralgias and back pain  Skin: Negative for color change and wound  Neurological: Negative for dizziness, facial asymmetry, light-headedness and headaches  Hematological: Negative for adenopathy  Psychiatric/Behavioral: Negative for agitation and behavioral problems  All other systems reviewed and are negative  Past Medical and Surgical History:     Past Medical History:   Diagnosis Date    DVT (deep vein thrombosis) in pregnancy        Past Surgical History:   Procedure Laterality Date    ABDOMINAL SURGERY      Gastric Sleeve      SECTION         Meds/Allergies:    Prior to Admission medications    Medication Sig Start Date End Date Taking? Authorizing Provider   ergocalciferol (VITAMIN D2) 50,000 units TAKE 2 CAPSULES BY MOUTH ONCE A WEEK 21  Yes Historical Provider, MD   fexofenadine (ALLEGRA) 180 MG tablet Take 180 mg by mouth   Yes Historical Provider, MD   Multiple Vitamins-Minerals (Multivitamin Adult Extra C) CHEW Chew 2 each   Yes Historical Provider, MD     I have reviewed home medications with patient personally      Allergies: No Known Allergies    Social History:     Marital Status: /Civil Union   Occupation:  Works from home  Patient Pre-hospital Living Situation:  At home  Patient Pre-hospital Level of Mobility:  Independent  Patient Pre-hospital Diet Restrictions:  No restrictions  Substance Use History:   Social History     Substance and Sexual Activity   Alcohol Use Never    Frequency: Never     Social History     Tobacco Use   Smoking Status Former Smoker    Types: Cigarettes    Quit date:     Years since quittin 1   Smokeless Tobacco Never Used     Social History     Substance and Sexual Activity   Drug Use Never       Family History:    non-contributory    Physical Exam:     Vitals:   Blood Pressure: 138/97 (21 1215)  Pulse: 64 (21 1215)  Temperature: 98 °F (36 7 °C) (21 1215)  Temp Source: Temporal (21 1014)  Respirations: 16 (21 1215)  Height: 5' 7" (170 2 cm) (21 1014)  Weight - Scale: 113 kg (248 lb 0 3 oz) (03/03/21 1014)  SpO2: 98 % (03/03/21 1215)    Physical Exam  Vitals signs and nursing note reviewed  Exam conducted with a chaperone present  Constitutional:       Appearance: She is obese  She is not diaphoretic  HENT:      Nose: No congestion  Mouth/Throat:      Pharynx: Oropharynx is clear  No oropharyngeal exudate  Eyes:      General: No scleral icterus  Conjunctiva/sclera: Conjunctivae normal       Pupils: Pupils are equal, round, and reactive to light  Neck:      Musculoskeletal: Normal range of motion  Cardiovascular:      Rate and Rhythm: Normal rate  Heart sounds: No gallop  Pulmonary:      Effort: Pulmonary effort is normal  No respiratory distress  Breath sounds: No wheezing, rhonchi or rales  Abdominal:      General: Abdomen is flat  Bowel sounds are normal  There is no distension  Palpations: There is no mass  Tenderness: There is no abdominal tenderness  Hernia: No hernia is present  Musculoskeletal:      Comments: Varicose vein affecting left lower extremities   Lymphadenopathy:      Cervical: No cervical adenopathy  Skin:     Findings: No erythema  Neurological:      Mental Status: She is alert and oriented to person, place, and time  Cranial Nerves: No cranial nerve deficit  Sensory: No sensory deficit  Motor: No weakness  Coordination: Coordination normal    Psychiatric:         Mood and Affect: Mood normal          Additional Data:     Lab Results: I have personally reviewed pertinent reports        Results from last 7 days   Lab Units 03/03/21  1020   WBC Thousand/uL 8 18   HEMOGLOBIN g/dL 13 2   HEMATOCRIT % 40 2   PLATELETS Thousands/uL 298   NEUTROS PCT % 57   LYMPHS PCT % 33   MONOS PCT % 7   EOS PCT % 2     Results from last 7 days   Lab Units 03/03/21  1020   SODIUM mmol/L 140   POTASSIUM mmol/L 4 3   CHLORIDE mmol/L 103   CO2 mmol/L 33*   BUN mg/dL 18   CREATININE mg/dL 0 79   ANION GAP mmol/L 4   CALCIUM mg/dL 8 9   ALBUMIN g/dL 3 5   TOTAL BILIRUBIN mg/dL 0 73   ALK PHOS U/L 67   ALT U/L 18   AST U/L 12   GLUCOSE RANDOM mg/dL 154*     Results from last 7 days   Lab Units 03/03/21  1020   INR  1 02                   Imaging: I have personally reviewed pertinent reports  CTA ED chest PE Study   Final Result by Rosmrey Richardson MD (03/03 1119)      Few small right-sided pulmonary emboli  Measured RV/LV ratio is within normal limits at less than 0 9  Clear lungs  I personally discussed this study with Joan Her on 3/3/2021 at 11:13 AM                Workstation performed: [id number]         XR chest 1 view portable   Final Result by Mariela Maciel MD (03/03 1119)   No acute cardiopulmonary disease  Findings are stable            Workstation performed: [id number]             EKG, Pathology, and Other Studies Reviewed on Admission:   · EKG:  Normal sinus rhythm  Allscripts / Epic Records Reviewed: Yes     ** Please Note: This note has been constructed using a voice recognition system   **

## 2021-03-04 VITALS
OXYGEN SATURATION: 97 % | SYSTOLIC BLOOD PRESSURE: 128 MMHG | TEMPERATURE: 98 F | HEART RATE: 67 BPM | BODY MASS INDEX: 38.93 KG/M2 | WEIGHT: 248.02 LBS | HEIGHT: 67 IN | RESPIRATION RATE: 17 BRPM | DIASTOLIC BLOOD PRESSURE: 79 MMHG

## 2021-03-04 LAB
ALBUMIN SERPL BCP-MCNC: 3.3 G/DL (ref 3.5–5)
ALP SERPL-CCNC: 64 U/L (ref 46–116)
ALT SERPL W P-5'-P-CCNC: 16 U/L (ref 12–78)
ANION GAP SERPL CALCULATED.3IONS-SCNC: 8 MMOL/L (ref 4–13)
APTT PPP: 110 SECONDS (ref 23–37)
APTT PPP: 90 SECONDS (ref 23–37)
AST SERPL W P-5'-P-CCNC: 12 U/L (ref 5–45)
BASOPHILS # BLD AUTO: 0.06 THOUSANDS/ΜL (ref 0–0.1)
BASOPHILS NFR BLD AUTO: 1 % (ref 0–1)
BILIRUB SERPL-MCNC: 0.68 MG/DL (ref 0.2–1)
BUN SERPL-MCNC: 12 MG/DL (ref 5–25)
CALCIUM ALBUM COR SERPL-MCNC: 9.4 MG/DL (ref 8.3–10.1)
CALCIUM SERPL-MCNC: 8.8 MG/DL (ref 8.3–10.1)
CARDIOLIPIN IGA SER IA-ACNC: <9 APL U/ML (ref 0–11)
CARDIOLIPIN IGG SER IA-ACNC: <9 GPL U/ML (ref 0–14)
CARDIOLIPIN IGM SER IA-ACNC: <9 MPL U/ML (ref 0–12)
CHLORIDE SERPL-SCNC: 104 MMOL/L (ref 100–108)
CO2 SERPL-SCNC: 28 MMOL/L (ref 21–32)
CREAT SERPL-MCNC: 0.68 MG/DL (ref 0.6–1.3)
EOSINOPHIL # BLD AUTO: 0.2 THOUSAND/ΜL (ref 0–0.61)
EOSINOPHIL NFR BLD AUTO: 3 % (ref 0–6)
ERYTHROCYTE [DISTWIDTH] IN BLOOD BY AUTOMATED COUNT: 13 % (ref 11.6–15.1)
GFR SERPL CREATININE-BSD FRML MDRD: 99 ML/MIN/1.73SQ M
GLUCOSE SERPL-MCNC: 107 MG/DL (ref 65–140)
HCT VFR BLD AUTO: 39.4 % (ref 34.8–46.1)
HGB BLD-MCNC: 13.2 G/DL (ref 11.5–15.4)
IMM GRANULOCYTES # BLD AUTO: 0.01 THOUSAND/UL (ref 0–0.2)
IMM GRANULOCYTES NFR BLD AUTO: 0 % (ref 0–2)
LYMPHOCYTES # BLD AUTO: 4.51 THOUSANDS/ΜL (ref 0.6–4.47)
LYMPHOCYTES NFR BLD AUTO: 55 % (ref 14–44)
MCH RBC QN AUTO: 30.2 PG (ref 26.8–34.3)
MCHC RBC AUTO-ENTMCNC: 33.5 G/DL (ref 31.4–37.4)
MCV RBC AUTO: 90 FL (ref 82–98)
MONOCYTES # BLD AUTO: 0.44 THOUSAND/ΜL (ref 0.17–1.22)
MONOCYTES NFR BLD AUTO: 5 % (ref 4–12)
NEUTROPHILS # BLD AUTO: 2.91 THOUSANDS/ΜL (ref 1.85–7.62)
NEUTS SEG NFR BLD AUTO: 36 % (ref 43–75)
NRBC BLD AUTO-RTO: 0 /100 WBCS
PLATELET # BLD AUTO: 304 THOUSANDS/UL (ref 149–390)
PMV BLD AUTO: 9.4 FL (ref 8.9–12.7)
POTASSIUM SERPL-SCNC: 3.9 MMOL/L (ref 3.5–5.3)
PROT C AG ACT/NOR PPP IA: 129 % OF NORMAL (ref 60–150)
PROT S ACT/NOR PPP: 103 % (ref 68–108)
PROT SERPL-MCNC: 7 G/DL (ref 6.4–8.2)
RBC # BLD AUTO: 4.37 MILLION/UL (ref 3.81–5.12)
SODIUM SERPL-SCNC: 140 MMOL/L (ref 136–145)
WBC # BLD AUTO: 8.13 THOUSAND/UL (ref 4.31–10.16)

## 2021-03-04 PROCEDURE — 80053 COMPREHEN METABOLIC PANEL: CPT | Performed by: FAMILY MEDICINE

## 2021-03-04 PROCEDURE — 85730 THROMBOPLASTIN TIME PARTIAL: CPT | Performed by: FAMILY MEDICINE

## 2021-03-04 PROCEDURE — 85025 COMPLETE CBC W/AUTO DIFF WBC: CPT | Performed by: FAMILY MEDICINE

## 2021-03-04 PROCEDURE — 99217 PR OBSERVATION CARE DISCHARGE MANAGEMENT: CPT | Performed by: FAMILY MEDICINE

## 2021-03-04 RX ADMIN — APIXABAN 10 MG: 5 TABLET, FILM COATED ORAL at 15:51

## 2021-03-04 RX ADMIN — HEPARIN SODIUM 13 UNITS/KG/HR: 10000 INJECTION, SOLUTION INTRAVENOUS at 03:10

## 2021-03-04 RX ADMIN — STANDARDIZED SENNA CONCENTRATE 8.6 MG: 8.6 TABLET ORAL at 08:53

## 2021-03-04 RX ADMIN — ACETAMINOPHEN 650 MG: 325 TABLET ORAL at 05:26

## 2021-03-04 RX ADMIN — POLYETHYLENE GLYCOL 3350 17 G: 17 POWDER, FOR SOLUTION ORAL at 08:53

## 2021-03-04 NOTE — PLAN OF CARE
Problem: PAIN - ADULT  Goal: Verbalizes/displays adequate comfort level or baseline comfort level  Description: Interventions:  - Encourage patient to monitor pain and request assistance  - Assess pain using appropriate pain scale, 0-10  - Administer analgesics based on type and severity of pain and evaluate response  - Implement non-pharmacological measures as appropriate and evaluate response  - Consider cultural and social influences on pain and pain management  - Notify physician/advanced practitioner if interventions unsuccessful or patient reports new pain  3/4/2021 1552 by Selene Laughlin RN  Outcome: Adequate for Discharge  3/4/2021 1237 by Selene Laughlin RN  Outcome: Progressing     Problem: SAFETY ADULT  Goal: Patient will remain free of falls  Description: INTERVENTIONS:  - Assess patient frequently for physical needs  -  Identify cognitive and physical deficits and behaviors that affect risk of falls    -  [place] fall precautions as indicated by assessment   - Educate patient/family on patient safety including physical limitations  - Instruct patient to call for assistance with activity based on assessment  - Modify environment to reduce risk of injury  - Consider OT/PT consult to assist with strengthening/mobility  3/4/2021 1552 by Selene Laughlin RN  Outcome: Adequate for Discharge  3/4/2021 1237 by Selene Laughlin RN  Outcome: Progressing  Goal: Maintain or return to baseline ADL function  Description: INTERVENTIONS:  -  Assess patient's ability to carry out ADLs; assess patient's baseline for ADL function and identify physical deficits which impact ability to perform ADLs (bathing, care of mouth/teeth, toileting, grooming, dressing, etc )  - Assess/evaluate cause of self-care deficits   - Assess range of motion  - Assess patient's mobility; develop plan if impaired  - Assess patient's need for assistive devices and provide as appropriate  - Encourage maximum independence but intervene and supervise when necessary  - Involve family in performance of ADLs  - Assess for home care needs following discharge   - Consider OT consult to assist with ADL evaluation and planning for discharge  - Provide patient education as appropriate  3/4/2021 1552 by Elva Robbins RN  Outcome: Adequate for Discharge  3/4/2021 1237 by Elva Robbins RN  Outcome: Progressing  Goal: Maintain or return mobility status to optimal level  Description: INTERVENTIONS:  - Assess patient's baseline mobility status (ambulation, transfers, stairs, etc )    - Identify cognitive and physical deficits and behaviors that affect mobility  - Identify mobility aids required to assist with transfers and/or ambulation (gait belt, sit-to-stand, lift, walker, cane, etc )  - [place] fall precautions as indicated by assessment  - Record patient progress and toleration of activity level on Mobility SBAR; progress patient to next Phase/Stage  - Instruct patient to call for assistance with activity based on assessment  - Consider rehabilitation consult to assist with strengthening/weightbearing, etc   3/4/2021 1552 by Elva Robbins RN  Outcome: Adequate for Discharge  3/4/2021 1237 by Elva Robbins RN  Outcome: Progressing     Problem: SAFETY ADULT  Goal: Maintain or return to baseline ADL function  Description: INTERVENTIONS:  -  Assess patient's ability to carry out ADLs; assess patient's baseline for ADL function and identify physical deficits which impact ability to perform ADLs (bathing, care of mouth/teeth, toileting, grooming, dressing, etc )  - Assess/evaluate cause of self-care deficits   - Assess range of motion  - Assess patient's mobility; develop plan if impaired  - Assess patient's need for assistive devices and provide as appropriate  - Encourage maximum independence but intervene and supervise when necessary  - Involve family in performance of ADLs  - Assess for home care needs following discharge   - Consider OT consult to assist with ADL evaluation and planning for discharge  - Provide patient education as appropriate  3/4/2021 1552 by Elva Robbins RN  Outcome: Adequate for Discharge  3/4/2021 1237 by Elva Robbins RN  Outcome: Progressing     Problem: SAFETY ADULT  Goal: Maintain or return mobility status to optimal level  Description: INTERVENTIONS:  - Assess patient's baseline mobility status (ambulation, transfers, stairs, etc )    - Identify cognitive and physical deficits and behaviors that affect mobility  - Identify mobility aids required to assist with transfers and/or ambulation (gait belt, sit-to-stand, lift, walker, cane, etc )  - [place] fall precautions as indicated by assessment  - Record patient progress and toleration of activity level on Mobility SBAR; progress patient to next Phase/Stage  - Instruct patient to call for assistance with activity based on assessment  - Consider rehabilitation consult to assist with strengthening/weightbearing, etc   3/4/2021 1552 by Elva Robbins RN  Outcome: Adequate for Discharge  3/4/2021 1237 by Elva Robbins RN  Outcome: Progressing     Problem: DISCHARGE PLANNING  Goal: Discharge to home or other facility with appropriate resources  Description: INTERVENTIONS:  - Identify barriers to discharge w/patient and caregiver  - Arrange for needed discharge resources and transportation as appropriate  - Identify discharge learning needs (meds, wound care, etc )  - Arrange for interpretive services to assist at discharge as needed  - Refer to Case Management Department for coordinating discharge planning if the patient needs post-hospital services based on physician/advanced practitioner order or complex needs related to functional status, cognitive ability, or social support system  3/4/2021 1552 by Elva Robbins RN  Outcome: Adequate for Discharge  3/4/2021 1237 by Luster Aase, RN  Outcome: Progressing     Problem: Knowledge Deficit  Goal: Patient/family/caregiver demonstrates understanding of disease process, treatment plan, medications, and discharge instructions  Description: Complete learning assessment and assess knowledge base  Interventions:  - Provide teaching at level of understanding  - Provide teaching via preferred learning methods  3/4/2021 1552 by Luster Aase, RN  Outcome: Adequate for Discharge  3/4/2021 1237 by Luster Aase, RN  Outcome: Progressing     Problem: Knowledge Deficit  Goal: Patient/family/caregiver demonstrates understanding of disease process, treatment plan, medications, and discharge instructions  Description: Complete learning assessment and assess knowledge base    Interventions:  - Provide teaching at level of understanding  - Provide teaching via preferred learning methods  3/4/2021 1552 by Luster Aase, RN  Outcome: Adequate for Discharge  3/4/2021 1237 by Luster Aase, RN  Outcome: Progressing     Problem: CARDIOVASCULAR - ADULT  Goal: Maintains optimal cardiac output and hemodynamic stability  Description: INTERVENTIONS:  - Monitor I/O, vital signs and rhythm  - Monitor for S/S and trends of decreased cardiac output  - Administer and titrate ordered vasoactive medications to optimize hemodynamic stability  - Assess quality of pulses, skin color and temperature  - Assess for signs of decreased coronary artery perfusion  - Instruct patient to report change in severity of symptoms  3/4/2021 1552 by Luster Aase, RN  Outcome: Adequate for Discharge  3/4/2021 1237 by Luster Aase, RN  Outcome: Progressing  Goal: Absence of cardiac dysrhythmias or at baseline rhythm  Description: INTERVENTIONS:  - Continuous cardiac monitoring, vital signs, obtain 12 lead EKG if ordered  - Administer antiarrhythmic and heart rate control medications as ordered  - Monitor electrolytes and administer replacement therapy as ordered  3/4/2021 1552 by Bg Segovia RN  Outcome: Adequate for Discharge  3/4/2021 1237 by Bg Segovia RN  Outcome: Progressing     Problem: CARDIOVASCULAR - ADULT  Goal: Maintains optimal cardiac output and hemodynamic stability  Description: INTERVENTIONS:  - Monitor I/O, vital signs and rhythm  - Monitor for S/S and trends of decreased cardiac output  - Administer and titrate ordered vasoactive medications to optimize hemodynamic stability  - Assess quality of pulses, skin color and temperature  - Assess for signs of decreased coronary artery perfusion  - Instruct patient to report change in severity of symptoms  3/4/2021 1552 by Bg Segovia RN  Outcome: Adequate for Discharge  3/4/2021 1237 by Bg Segovia RN  Outcome: Progressing     Problem: CARDIOVASCULAR - ADULT  Goal: Absence of cardiac dysrhythmias or at baseline rhythm  Description: INTERVENTIONS:  - Continuous cardiac monitoring, vital signs, obtain 12 lead EKG if ordered  - Administer antiarrhythmic and heart rate control medications as ordered  - Monitor electrolytes and administer replacement therapy as ordered  3/4/2021 1552 by Bg Segovia RN  Outcome: Adequate for Discharge  3/4/2021 1237 by Bg Segovia RN  Outcome: Progressing     Problem: SKIN/TISSUE INTEGRITY - ADULT  Goal: Skin integrity remains intact  Description: INTERVENTIONS  - Identify patients at risk for skin breakdown  - Assess and monitor skin integrity  - Assess and monitor nutrition and hydration status  - Monitor labs (i e  albumin)  - Assess for incontinence   - Turn and reposition patient  - Assist with mobility/ambulation  - Relieve pressure over bony prominences  - Avoid friction and shearing  - Provide appropriate hygiene as needed including keeping skin clean and dry  - Evaluate need for skin moisturizer/barrier cream  - Collaborate with interdisciplinary team (i e  Nutrition, Rehabilitation, etc )   - Patient/family teaching  3/4/2021 1552 by Ina Cristobal RN  Outcome: Adequate for Discharge  3/4/2021 1237 by Ina Cristobal RN  Outcome: Progressing  Goal: Incision(s), wounds(s) or drain site(s) healing without S/S of infection  Description: INTERVENTIONS  - Assess and document risk factors for skin impairment   - Assess and document dressing, incision, wound bed, drain sites and surrounding tissue  - Consider nutrition services referral as needed  - Oral mucous membranes remain intact  - Provide patient/ family education  3/4/2021 1552 by Ina Cristobal RN  Outcome: Adequate for Discharge  3/4/2021 1237 by Ina Cristobal RN  Outcome: Progressing  Goal: Oral mucous membranes remain intact  Description: INTERVENTIONS  - Assess oral mucosa and hygiene practices  - Implement preventative oral hygiene regimen  - Implement oral medicated treatments as ordered  - Initiate Nutrition services referral as needed  3/4/2021 1552 by Ina Cristobal RN  Outcome: Adequate for Discharge  3/4/2021 1237 by Ina Cristobal RN  Outcome: Progressing     Problem: SKIN/TISSUE INTEGRITY - ADULT  Goal: Oral mucous membranes remain intact  Description: INTERVENTIONS  - Assess oral mucosa and hygiene practices  - Implement preventative oral hygiene regimen  - Implement oral medicated treatments as ordered  - Initiate Nutrition services referral as needed  3/4/2021 1552 by Ina Cristobal RN  Outcome: Adequate for Discharge  3/4/2021 1237 by Ina Cristobal RN  Outcome: Progressing     Problem: SKIN/TISSUE INTEGRITY - ADULT  Goal: Incision(s), wounds(s) or drain site(s) healing without S/S of infection  Description: INTERVENTIONS  - Assess and document risk factors for skin impairment   - Assess and document dressing, incision, wound bed, drain sites and surrounding tissue  - Consider nutrition services referral as needed  - Oral mucous membranes remain intact  - Provide patient/ family education  3/4/2021 1552 by Ina Cristobal RN  Outcome: Adequate for Discharge  3/4/2021 1237 by Ina Cristobal RN  Outcome: Progressing     Problem: MUSCULOSKELETAL - ADULT  Goal: Maintain or return mobility to safest level of function  Description: INTERVENTIONS:  - Assess patient's ability to carry out ADLs; assess patient's baseline for ADL function and identify physical deficits which impact ability to perform ADLs (bathing, care of mouth/teeth, toileting, grooming, dressing, etc )  - Assess/evaluate cause of self-care deficits   - Assess range of motion  - Assess patient's mobility  - Assess patient's need for assistive devices and provide as appropriate  - Encourage maximum independence but intervene and supervise when necessary  - Involve family in performance of ADLs  - Assess for home care needs following discharge   - Consider OT consult to assist with ADL evaluation and planning for discharge  - Provide patient education as appropriate  3/4/2021 1552 by Ina Cristobal RN  Outcome: Adequate for Discharge  3/4/2021 1237 by Ina Cristobal RN  Outcome: Progressing  Goal: Maintain proper alignment of affected body part  Description: INTERVENTIONS:  - Support, maintain and protect limb and body alignment  - Provide patient/ family with appropriate education  3/4/2021 1552 by Ina Cristobal RN  Outcome: Adequate for Discharge  3/4/2021 1237 by Ina Cristobal RN  Outcome: Progressing     Problem: Potential for Falls  Goal: Patient will remain free of falls  Description: INTERVENTIONS:  - Assess patient frequently for physical needs  -  Identify cognitive and physical deficits and behaviors that affect risk of falls    -  [place] fall precautions as indicated by assessment   - Educate patient/family on patient safety including physical limitations  - Instruct patient to call for assistance with activity based on assessment  - Modify environment to reduce risk of injury  - Consider OT/PT consult to assist with strengthening/mobility  3/4/2021 1552 by Sundeep Vasquez RN  Outcome: Adequate for Discharge  3/4/2021 1237 by Sundeep Vasquez RN  Outcome: Progressing

## 2021-03-04 NOTE — PLAN OF CARE
Problem: PAIN - ADULT  Goal: Verbalizes/displays adequate comfort level or baseline comfort level  Description: Interventions:  - Encourage patient to monitor pain and request assistance  - Assess pain using appropriate pain scale, 0-10  - Administer analgesics based on type and severity of pain and evaluate response  - Implement non-pharmacological measures as appropriate and evaluate response  - Consider cultural and social influences on pain and pain management  - Notify physician/advanced practitioner if interventions unsuccessful or patient reports new pain  Outcome: Progressing     Problem: SAFETY ADULT  Goal: Patient will remain free of falls  Description: INTERVENTIONS:  - Assess patient frequently for physical needs  -  Identify cognitive and physical deficits and behaviors that affect risk of falls    -  [place] fall precautions as indicated by assessment   - Educate patient/family on patient safety including physical limitations  - Instruct patient to call for assistance with activity based on assessment  - Modify environment to reduce risk of injury  - Consider OT/PT consult to assist with strengthening/mobility  Outcome: Progressing  Goal: Maintain or return to baseline ADL function  Description: INTERVENTIONS:  -  Assess patient's ability to carry out ADLs; assess patient's baseline for ADL function and identify physical deficits which impact ability to perform ADLs (bathing, care of mouth/teeth, toileting, grooming, dressing, etc )  - Assess/evaluate cause of self-care deficits   - Assess range of motion  - Assess patient's mobility; develop plan if impaired  - Assess patient's need for assistive devices and provide as appropriate  - Encourage maximum independence but intervene and supervise when necessary  - Involve family in performance of ADLs  - Assess for home care needs following discharge   - Consider OT consult to assist with ADL evaluation and planning for discharge  - Provide patient education as appropriate  Outcome: Progressing  Goal: Maintain or return mobility status to optimal level  Description: INTERVENTIONS:  - Assess patient's baseline mobility status (ambulation, transfers, stairs, etc )    - Identify cognitive and physical deficits and behaviors that affect mobility  - Identify mobility aids required to assist with transfers and/or ambulation (gait belt, sit-to-stand, lift, walker, cane, etc )  - [place] fall precautions as indicated by assessment  - Record patient progress and toleration of activity level on Mobility SBAR; progress patient to next Phase/Stage  - Instruct patient to call for assistance with activity based on assessment  - Consider rehabilitation consult to assist with strengthening/weightbearing, etc   Outcome: Progressing     Problem: DISCHARGE PLANNING  Goal: Discharge to home or other facility with appropriate resources  Description: INTERVENTIONS:  - Identify barriers to discharge w/patient and caregiver  - Arrange for needed discharge resources and transportation as appropriate  - Identify discharge learning needs (meds, wound care, etc )  - Arrange for interpretive services to assist at discharge as needed  - Refer to Case Management Department for coordinating discharge planning if the patient needs post-hospital services based on physician/advanced practitioner order or complex needs related to functional status, cognitive ability, or social support system  Outcome: Progressing     Problem: Knowledge Deficit  Goal: Patient/family/caregiver demonstrates understanding of disease process, treatment plan, medications, and discharge instructions  Description: Complete learning assessment and assess knowledge base    Interventions:  - Provide teaching at level of understanding  - Provide teaching via preferred learning methods  Outcome: Progressing     Problem: RESPIRATORY - ADULT  Goal: Achieves optimal ventilation and oxygenation  Description: INTERVENTIONS:  - Assess for changes in respiratory status  - Assess for changes in mentation and behavior  - Position to facilitate oxygenation and minimize respiratory effort  - Oxygen administered by appropriate delivery if ordered  - Initiate smoking cessation education as indicated  - Encourage broncho-pulmonary hygiene including cough, deep breathe, Incentive Spirometry  - Assess the need for suctioning and aspirate as needed  - Assess and instruct to report SOB or any respiratory difficulty  - Respiratory Therapy support as indicated  Outcome: Progressing     Problem: HEMATOLOGIC - ADULT  Goal: Maintains hematologic stability  Description: INTERVENTIONS  - Assess for signs and symptoms of bleeding or hemorrhage  - Monitor labs  - Administer supportive blood products/factors as ordered and appropriate  Outcome: Progressing     Problem: MUSCULOSKELETAL - ADULT  Goal: Maintain or return mobility to safest level of function  Description: INTERVENTIONS:  - Assess patient's ability to carry out ADLs; assess patient's baseline for ADL function and identify physical deficits which impact ability to perform ADLs (bathing, care of mouth/teeth, toileting, grooming, dressing, etc )  - Assess/evaluate cause of self-care deficits   - Assess range of motion  - Assess patient's mobility  - Assess patient's need for assistive devices and provide as appropriate  - Encourage maximum independence but intervene and supervise when necessary  - Involve family in performance of ADLs  - Assess for home care needs following discharge   - Consider OT consult to assist with ADL evaluation and planning for discharge  - Provide patient education as appropriate  Outcome: Progressing  Goal: Maintain proper alignment of affected body part  Description: INTERVENTIONS:  - Support, maintain and protect limb and body alignment  - Provide patient/ family with appropriate education  Outcome: Progressing

## 2021-03-04 NOTE — DISCHARGE INSTRUCTIONS
Apixaban (By mouth)   Apixaban (a-PIX-a-ban)  Treats and prevents blood clots  This medicine is a blood thinner  Brand Name(s): Eliquis Eliqumarely 30 Day DVT/PE Starter Pack   There may be other brand names for this medicine  When This Medicine Should Not Be Used: This medicine is not right for everyone  Do not use it if you had an allergic reaction to apixaban or you have active bleeding  How to Use This Medicine:   Tablet  · Your doctor will tell you how much medicine to use  Do not use more than directed  · If you are not able to swallow the tablets whole, they may be crushed and mixed in water, 5% dextrose in water (D5W), apple juice, or applesauce  The crushed tablets may be mixed with 60 mL of water or D5W dose and given through a nasogastric tube (NGT)  · This medicine should come with a Medication Guide  Ask your pharmacist for a copy if you do not have one  · Missed dose: Take a dose as soon as you remember  If it is almost time for your next dose, wait until then and take a regular dose  Do not take extra medicine to make up for a missed dose  · Store the medicine in a closed container at room temperature, away from heat, moisture, and direct light  Drugs and Foods to Avoid:   Ask your doctor or pharmacist before using any other medicine, including over-the-counter medicines, vitamins, and herbal products  · Some medicines can affect how apixaban works  Tell your doctor if you are using any of the following:   ? Carbamazepine, itraconazole, ketoconazole, phenytoin, rifampin, ritonavir, Joseph's wort  ? Blood thinner (including clopidogrel, heparin, prasugrel, warfarin)  ? Medicine to treat depression  ?  NSAID pain or arthritis medicine (including aspirin, celecoxib, diclofenac, ibuprofen, naproxen)  Warnings While Using This Medicine:   · Tell your doctor if you are pregnant or breastfeeding, or if you have kidney disease, liver disease, bleeding problems, antiphospholipid syndrome, or an artificial heart valve  · Do not stop using this medicine suddenly without asking your doctor  You might have a higher risk of stroke for a short time after you stop using this medicine  · This medicine increases your risk for bleeding that can become serious if not controlled  You may also bruise easily, and it may take longer than usual for bleeding to stop  · This medicine may increase your risk for a hematoma (blood clot) in your spine or back if you undergo an epidural or spinal puncture  This could lead to paralysis  Tell your doctor if you ever had spine problems or back surgery  · Tell any doctor or dentist who treats you that you are using this medicine  With your doctor's supervision, you may need to stop using this medicine several days before you have surgery or medical tests  · Your doctor will do lab tests at regular visits to check on the effects of this medicine  Keep all appointments  · Keep all medicine out of the reach of children  Never share your medicine with anyone  Possible Side Effects While Using This Medicine:   Call your doctor right away if you notice any of these side effects:  · Allergic reaction: Itching or hives, swelling in your face or hands, swelling or tingling in your mouth or throat, chest tightness, trouble breathing  · Change in how much or how often you urinate, red or pink urine  · Chest pain, trouble breathing  · Coughing up blood, vomiting blood or material that looks like coffee grounds  · Numbness, tingling, or muscle weakness in your legs or feet  · Red or black, tarry stools  · Unusual bleeding, bruising, or weakness  If you notice other side effects that you think are caused by this medicine, tell your doctor  Call your doctor for medical advice about side effects   You may report side effects to FDA at 2-960-FDA-4244  © Copyright [address] [place] [address] Drive Information is for End User's use only and may not be sold, redistributed or otherwise used for commercial purposes  The above information is an  only  It is not intended as medical advice for individual conditions or treatments  Talk to your doctor, nurse or pharmacist before following any medical regimen to see if it is safe and effective for you  Pulmonary Embolism   WHAT YOU NEED TO KNOW:   A pulmonary embolism (PE) is the sudden blockage of a blood vessel in the lungs by an embolus  A PE can become life-threatening  Go to follow-up appointments and take blood thinners as directed  These are especially important if you were discharged home from the emergency department  DISCHARGE INSTRUCTIONS:   Call your local emergency number (911 in the [facility]) if:   · You feel lightheaded, short of breath, and have chest pain  · You cough up blood  Call your doctor if:   · Your arm or leg feels warm, tender, and painful  It may look swollen and red  · You have questions or concerns about your condition or care  Medicines:   · Blood thinners  help prevent blood clots  Clots can cause strokes, heart attacks, and death  The following are general safety guidelines to follow while you are taking a blood thinner:    ? Watch for bleeding and bruising while you take blood thinners  Watch for bleeding from your gums or nose  Watch for blood in your urine and bowel movements  Use a soft washcloth on your skin, and a soft toothbrush to brush your teeth  This can keep your skin and gums from bleeding  If you shave, use an electric shaver  Do not play contact sports  ? Tell your dentist and other healthcare providers that you take a blood thinner  Wear a bracelet or necklace that says you take this medicine  ? Do not start or stop any other medicines unless your healthcare provider tells you to  Many medicines cannot be used with blood thinners  ? Take your blood thinner exactly as prescribed by your healthcare provider  Do not skip does or take less than prescribed   Tell your provider right away if you forget to take your blood thinner, or if you take too much  ? Warfarin  is a blood thinner that you may need to take  The following are things you should be aware of if you take warfarin:     § Foods and medicines can affect the amount of warfarin in your blood  Do not make major changes to your diet while you take warfarin  Warfarin works best when you eat about the same amount of vitamin K every day  Vitamin K is found in green leafy vegetables and certain other foods  Ask for more information about what to eat when you are taking warfarin  § You will need to see your healthcare provider for follow-up visits when you are on warfarin  You will need regular blood tests  These tests are used to decide how much medicine you need  · Take your medicine as directed  Contact your healthcare provider if you think your medicine is not helping or if you have side effects  Tell him or her if you are allergic to any medicine  Keep a list of the medicines, vitamins, and herbs you take  Include the amounts, and when and why you take them  Bring the list or the pill bottles to follow-up visits  Carry your medicine list with you in case of an emergency  Prevent another PE:   · Change your body position or move around often  Move and stretch in your seat several times each hour if you travel by car or work at a desk  In an airplane, get up and walk every hour  · Exercise regularly to help increase your blood flow  Walking is a good low-impact exercise  Talk to your healthcare provider about the best exercise plan for you  · Maintain a healthy weight  Ask your healthcare provider how much you should weigh  Ask him or her to help you create a weight loss plan if you are overweight  · Do not smoke  Nicotine and other chemicals in cigarettes and cigars can damage blood vessels and increase your risk for another PE   Ask your healthcare provider for information if you currently smoke and need help to quit  E-cigarettes or smokeless tobacco still contain nicotine  Talk to your healthcare provider before you use these products  · Ask about birth control if you are a woman who takes the pill  A birth control pill increases the risk for blood clots in certain women  The risk is higher if you are also older than 35, smoke cigarettes, or have a blood clotting disorder  Talk to your healthcare provider about other ways to prevent pregnancy, such as a cervical cap or intrauterine device (IUD)  Follow up with your doctor or specialist as directed:  Make an appointment as soon as possible  You may also need to come in regularly for scans to check for blood clots  Your blood may checked to see how long it takes to clot  Your doctor or specialist will tell you if you need to have this test and how often to have it  Write down your questions so you remember to ask them during your visits  © Copyright [address] [place] [address] Drive Information is for End User's use only and may not be sold, redistributed or otherwise used for commercial purposes  All illustrations and images included in CareNotes® are the copyrighted property of A D A M , Inc  or [address] [place] [address]  The above information is an  only  It is not intended as medical advice for individual conditions or treatments  Talk to your doctor, nurse or pharmacist before following any medical regimen to see if it is safe and effective for you  Deep Vein Thrombosis   WHAT YOU NEED TO KNOW:   Deep vein thrombosis (DVT) is a blood clot that forms in a deep vein of the body  The DVT can break into smaller pieces and travel to your lungs and cause a blockage called a pulmonary embolism  A PE can become life-threatening  It is important to go to all follow-up appointments and to take blood thinners as directed  This is especially important if you were discharged home from the emergency department       DISCHARGE INSTRUCTIONS:   Call your local emergency number (911 in the [address]) if:   · You feel lightheaded, short of breath, and have chest pain  · You cough up blood  Call your doctor if:   · Your arm or leg feels warm, tender, and painful  It may look swollen and red  · You have questions or concerns about your condition or care  Medicines:   · Blood thinners  help prevent blood clots  Clots can cause strokes, heart attacks, and death  The following are general safety guidelines to follow while you are taking a blood thinner:    ? Watch for bleeding and bruising while you take blood thinners  Watch for bleeding from your gums or nose  Watch for blood in your urine and bowel movements  Use a soft washcloth on your skin, and a soft toothbrush to brush your teeth  This can keep your skin and gums from bleeding  If you shave, use an electric shaver  Do not play contact sports  ? Tell your dentist and other healthcare providers that you take a blood thinner  Wear a bracelet or necklace that says you take this medicine  ? Do not start or stop any other medicines unless your healthcare provider tells you to  Many medicines cannot be used with blood thinners  ? Take your blood thinner exactly as prescribed by your healthcare provider  Do not skip does or take less than prescribed  Tell your provider right away if you forget to take your blood thinner, or if you take too much  ? Warfarin  is a blood thinner that you may need to take  The following are things you should be aware of if you take warfarin:     § Foods and medicines can affect the amount of warfarin in your blood  Do not make major changes to your diet while you take warfarin  Warfarin works best when you eat about the same amount of vitamin K every day  Vitamin K is found in green leafy vegetables and certain other foods  Ask for more information about what to eat when you are taking warfarin  § You will need to see your healthcare provider for follow-up visits when you are on warfarin   You will need regular blood tests  These tests are used to decide how much medicine you need  · Take your medicine as directed  Contact your healthcare provider if you think your medicine is not helping or if you have side effects  Tell him or her if you are allergic to any medicine  Keep a list of the medicines, vitamins, and herbs you take  Include the amounts, and when and why you take them  Bring the list or the pill bottles to follow-up visits  Carry your medicine list with you in case of an emergency  Manage a DVT:   · Wear pressure stockings as directed  The stockings put pressure on your legs  This improves blood flow and helps prevent clots  Wear the stockings during the day  Do not wear them when you sleep  · Elevate your legs above the level of your heart  Elevate your legs when you sit or lie down, as often as you can  This will help decrease swelling and pain  Prop your legs on pillows or blankets to keep them elevated comfortably  Prevent a DVT:   · Exercise regularly to help increase your blood flow  Walking is a good low-impact exercise  Talk to your healthcare provider about the best exercise plan for you  · Change your body position or move around often  Move and stretch in your seat several times each hour if you travel by car or work at a desk  In an airplane, get up and walk every hour  Move your legs by tightening and releasing your leg muscles while sitting  You can move your legs while sitting by raising and lowering your heels  Keep your toes on the floor while you do this  You can also raise and lower your toes while keeping your heels on the floor  · Maintain a healthy weight  Ask your healthcare provider how much you should weigh  Ask him or her to help you create a weight loss plan if you are overweight  · Do not smoke    Nicotine and other chemicals in cigarettes and cigars can damage blood vessels and make it more difficult to manage your DVT  Ask your healthcare provider for information if you currently smoke and need help to quit  E-cigarettes or smokeless tobacco still contain nicotine  Talk to your healthcare provider before you use these products  · Ask about birth control if you are a woman who takes the pill  A birth control pill increases the risk for PE in certain women  The risk is higher if you are also older than 35, smoke cigarettes, or have a blood clotting disorder  Talk to your healthcare provider about other ways to prevent pregnancy, such as a cervical cap or intrauterine device (IUD)  Follow up with your doctor or specialist as directed: You may need to come in regularly for scans to check for blood clots  Your blood may checked to see how long it takes to clot  Your doctor or specialist will tell you if you need to have this test and how often to have it  Write down your questions so you remember to ask them during your visits  © Copyright [address] [place] [address] Drive Information is for End User's use only and may not be sold, redistributed or otherwise used for commercial purposes  All illustrations and images included in CareNotes® are the copyrighted property of A D [employer] M [employer] Inc  or [facility]   The above information is an  only  It is not intended as medical advice for individual conditions or treatments  Talk to your doctor, nurse or pharmacist before following any medical regimen to see if it is safe and effective for you

## 2021-03-04 NOTE — PLAN OF CARE
Problem: PAIN - ADULT  Goal: Verbalizes/displays adequate comfort level or baseline comfort level  Description: Interventions:  - Encourage patient to monitor pain and request assistance  - Assess pain using appropriate pain scale  - Administer analgesics based on type and severity of pain and evaluate response  - Implement non-pharmacological measures as appropriate and evaluate response  - Consider cultural and social influences on pain and pain management  - Notify physician/advanced practitioner if interventions unsuccessful or patient reports new pain  Outcome: Progressing     Problem: SAFETY ADULT  Goal: Patient will remain free of falls  Description: INTERVENTIONS:  - Assess patient frequently for physical needs  -  Identify cognitive and physical deficits and behaviors that affect risk of falls    -  Home fall precautions as indicated by assessment   - Educate patient/family on patient safety including physical limitations  - Instruct patient to call for assistance with activity based on assessment  - Modify environment to reduce risk of injury  - Consider OT/PT consult to assist with strengthening/mobility  Outcome: Progressing  Goal: Maintain or return to baseline ADL function  Description: INTERVENTIONS:  -  Assess patient's ability to carry out ADLs; assess patient's baseline for ADL function and identify physical deficits which impact ability to perform ADLs (bathing, care of mouth/teeth, toileting, grooming, dressing, etc )  - Assess/evaluate cause of self-care deficits   - Assess range of motion  - Assess patient's mobility; develop plan if impaired  - Assess patient's need for assistive devices and provide as appropriate  - Encourage maximum independence but intervene and supervise when necessary  - Involve family in performance of ADLs  - Assess for home care needs following discharge   - Consider OT consult to assist with ADL evaluation and planning for discharge  - Provide patient education as appropriate  Outcome: Progressing  Goal: Maintain or return mobility status to optimal level  Description: INTERVENTIONS:  - Assess patient's baseline mobility status (ambulation, transfers, stairs, etc )    - Identify cognitive and physical deficits and behaviors that affect mobility  - Identify mobility aids required to assist with transfers and/or ambulation (gait belt, sit-to-stand, lift, walker, cane, etc )  - [place] fall precautions as indicated by assessment  - Record patient progress and toleration of activity level on Mobility SBAR; progress patient to next Phase/Stage  - Instruct patient to call for assistance with activity based on assessment  - Consider rehabilitation consult to assist with strengthening/weightbearing, etc   Outcome: Progressing     Problem: DISCHARGE PLANNING  Goal: Discharge to home or other facility with appropriate resources  Description: INTERVENTIONS:  - Identify barriers to discharge w/patient and caregiver  - Arrange for needed discharge resources and transportation as appropriate  - Identify discharge learning needs (meds, wound care, etc )  - Arrange for interpretive services to assist at discharge as needed  - Refer to Case Management Department for coordinating discharge planning if the patient needs post-hospital services based on physician/advanced practitioner order or complex needs related to functional status, cognitive ability, or social support system  Outcome: Progressing     Problem: Knowledge Deficit  Goal: Patient/family/caregiver demonstrates understanding of disease process, treatment plan, medications, and discharge instructions  Description: Complete learning assessment and assess knowledge base    Interventions:  - Provide teaching at level of understanding  - Provide teaching via preferred learning methods  Outcome: Progressing     Problem: CARDIOVASCULAR - ADULT  Goal: Maintains optimal cardiac output and hemodynamic stability  Description: INTERVENTIONS:  - Monitor I/O, vital signs and rhythm  - Monitor for S/S and trends of decreased cardiac output  - Administer and titrate ordered vasoactive medications to optimize hemodynamic stability  - Assess quality of pulses, skin color and temperature  - Assess for signs of decreased coronary artery perfusion  - Instruct patient to report change in severity of symptoms  Outcome: Progressing  Goal: Absence of cardiac dysrhythmias or at baseline rhythm  Description: INTERVENTIONS:  - Continuous cardiac monitoring, vital signs, obtain 12 lead EKG if ordered  - Administer antiarrhythmic and heart rate control medications as ordered  - Monitor electrolytes and administer replacement therapy as ordered  Outcome: Progressing     Problem: RESPIRATORY - ADULT  Goal: Achieves optimal ventilation and oxygenation  Description: INTERVENTIONS:  - Assess for changes in respiratory status  - Assess for changes in mentation and behavior  - Position to facilitate oxygenation and minimize respiratory effort  - Oxygen administered by appropriate delivery if ordered  - Initiate smoking cessation education as indicated  - Encourage broncho-pulmonary hygiene including cough, deep breathe, Incentive Spirometry  - Assess the need for suctioning and aspirate as needed  - Assess and instruct to report SOB or any respiratory difficulty  - Respiratory Therapy support as indicated  Outcome: Progressing     Problem: SKIN/TISSUE INTEGRITY - ADULT  Goal: Skin integrity remains intact  Description: INTERVENTIONS  - Identify patients at risk for skin breakdown  - Assess and monitor skin integrity  - Assess and monitor nutrition and hydration status  - Monitor labs (i e  albumin)  - Assess for incontinence   - Turn and reposition patient  - Assist with mobility/ambulation  - Relieve pressure over bony prominences  - Avoid friction and shearing  - Provide appropriate hygiene as needed including keeping skin clean and dry  - Evaluate need for skin moisturizer/barrier cream  - Collaborate with interdisciplinary team (i e  Nutrition, Rehabilitation, etc )   - Patient/family teaching  Outcome: Progressing  Goal: Incision(s), wounds(s) or drain site(s) healing without S/S of infection  Description: INTERVENTIONS  - Assess and document risk factors for skin impairment   - Assess and document dressing, incision, wound bed, drain sites and surrounding tissue  - Consider nutrition services referral as needed  - Oral mucous membranes remain intact  - Provide patient/ family education  Outcome: Progressing  Goal: Oral mucous membranes remain intact  Description: INTERVENTIONS  - Assess oral mucosa and hygiene practices  - Implement preventative oral hygiene regimen  - Implement oral medicated treatments as ordered  - Initiate Nutrition services referral as needed  Outcome: Progressing     Problem: HEMATOLOGIC - ADULT  Goal: Maintains hematologic stability  Description: INTERVENTIONS  - Assess for signs and symptoms of bleeding or hemorrhage  - Monitor labs  - Administer supportive blood products/factors as ordered and appropriate  Outcome: Progressing     Problem: MUSCULOSKELETAL - ADULT  Goal: Maintain or return mobility to safest level of function  Description: INTERVENTIONS:  - Assess patient's ability to carry out ADLs; assess patient's baseline for ADL function and identify physical deficits which impact ability to perform ADLs (bathing, care of mouth/teeth, toileting, grooming, dressing, etc )  - Assess/evaluate cause of self-care deficits   - Assess range of motion  - Assess patient's mobility  - Assess patient's need for assistive devices and provide as appropriate  - Encourage maximum independence but intervene and supervise when necessary  - Involve family in performance of ADLs  - Assess for home care needs following discharge   - Consider OT consult to assist with ADL evaluation and planning for discharge  - Provide patient education as appropriate  Outcome: Progressing  Goal: Maintain proper alignment of affected body part  Description: INTERVENTIONS:  - Support, maintain and protect limb and body alignment  - Provide patient/ family with appropriate education  Outcome: Progressing     Problem: Potential for Falls  Goal: Patient will remain free of falls  Description: INTERVENTIONS:  - Assess patient frequently for physical needs  -  Identify cognitive and physical deficits and behaviors that affect risk of falls    -  [place] fall precautions as indicated by assessment   - Educate patient/family on patient safety including physical limitations  - Instruct patient to call for assistance with activity based on assessment  - Modify environment to reduce risk of injury  - Consider OT/PT consult to assist with strengthening/mobility  Outcome: Progressing

## 2021-03-04 NOTE — ASSESSMENT & PLAN NOTE
Most likely secondary to pulmonary embolism  Continue treatment as per protocol with heparin drip  Echocardiogram remained normal  Patient condition significantly improved with heparin drip  Patient will be discharged with p o  Eliquis  Risks, benefits, side effects discussed in details with the patient regarding the treatment and medication    Patient verbalizes to understand and agrees

## 2021-03-04 NOTE — ASSESSMENT & PLAN NOTE
History of gastric sleeve surgery in 2017  Bleeding precaution provided since patient will be on oral anticoagulation therapy due to PE  Risks, benefits, side effects and alternatives discussed her regarding the treatment and medications (Eliquis)-patient verbalizes understanding and agrees with the plan

## 2021-03-04 NOTE — UTILIZATION REVIEW
Initial Clinical Review    Admission: Date/Time/Statement:   Admission Orders (From admission, onward)     Ordered        03/03/21 1133  Place in Observation  Once                   Orders Placed This Encounter   Procedures    Place in Observation     Standing Status:   Standing     Number of Occurrences:   1     Order Specific Question:   Level of Care     Answer:   Med Surg [16]     ED Arrival Information     Expected Arrival Acuity Means of Arrival Escorted By Service Admission Type    - 3/3/2021 10:05 Emergent Wheelchair Self Hospitalist Emergency    Arrival Complaint    blood clot, leg pain left        Chief Complaint   Patient presents with    Leg Pain     pt c/o increased left leg pain, redness, and swelling from knee to calf for past week and developed sob over past couple days  hx DVT  denies travel/fevers/cough     Assessment/Plan: 55year old female to the ED from home with complaints of shortness of breath for a couple days, left leg pain, redness and swelling  H/O DVT secondary to pregnancies  No longer on anticoag  Admitted under observation for acute PE, shortness of breath  Occlusive thrombus  She arrives with bilateral lower extremity nonpitting edema  D-dimer elevated  CTA chest PE show: Few small right-sided pulmonary emboli  Venous duplex shows:Acute superficial thrombophlebitis at the SFJ (non-occlusive) and in the entireGSV  Occlusive thrombus in the GSV is 1 02 cm from the CFV  As per conversation with vascular, medical management at this time  SHe has left lower extremity varicose vein  Heparin drip started  Check ECHO      ED Triage Vitals   Temperature Pulse Respirations Blood Pressure SpO2   03/03/21 1014 03/03/21 1014 03/03/21 1014 03/03/21 1014 03/03/21 1014   98 7 °F (37 1 °C) 74 17 132/76 100 %      Temp Source Heart Rate Source Patient Position - Orthostatic VS BP Location FiO2 (%)   03/03/21 1014 03/03/21 1014 03/03/21 1014 03/03/21 1014 --   Temporal Monitor Lying Left arm Pain Score       03/03/21 1220       No Pain          Wt Readings from Last 1 Encounters:   03/03/21 113 kg (248 lb 0 3 oz)     Additional Vital Signs:   Date/Time  Temp  Pulse  Resp  BP  MAP (mmHg)  SpO2  O2 Device  Patient Position - Orthostatic VS   03/04/21 07:08:59  --  67  17  131/79  96  97 %  --  --   03/03/21 21:35:43  97 9 °F (36 6 °C)  86  18  130/79  96  99 %  None (Room air)  Lying   03/03/21 1947  --  --  --  --  --  --  None (Room air)  --   03/03/21 1650  --  76  16  --  --  95 %  None (Room air)  --   03/03/21 14:36:40  98 1 °F (36 7 °C)  76  18  134/75  95  95 %  None (Room air)  Lying   03/03/21 1220  --  --  --  --  --  --  None (Room air)  --   03/03/21 12:15:36  98 °F (36 7 °C)  64  16  138/97  111  98 %  --  --   03/03/21 1130  --  68  18  127/78  98  97 %  --  --   03/03/21 1045  --  69  23Abnormal   118/64  88  97 %  --  --   03/03/21 1014  98 7 °F (37 1 °C)  74  17  132/76               Pertinent Labs/Diagnostic Test Results:   3/3 CTA chest: Few small right-sided pulmonary emboli  Measured RV/LV ratio is within normal limits at less than 0 9      Clear lungs  3/3 CXR: No acute cardiopulmonary disease  Findings are stable  3/3 VAS lower limb venous duplex:    Impression:  RIGHT LOWER LIMB LIMITED:  Evaluation shows no evidence of thrombus in the common femoral vein  Doppler evaluation shows a normal response to augmentation maneuvers  LEFT LOWER LIMB:  No evidence of acute or chronic deep vein thrombosis  Acute superficial thrombophlebitis at the SFJ (non-occlusive) and in the entire  GSV  Occlusive thrombus in the GSV is 1 02 cm from the CFV  Doppler evaluation shows a normal response to augmentation maneuvers  Popliteal, posterior tibial and anterior tibial arterial Doppler waveforms are  triphasic        Results from last 7 days   Lab Units 03/04/21  0514 03/03/21  1020   WBC Thousand/uL 8 13 8 18   HEMOGLOBIN g/dL 13 2 13 2   HEMATOCRIT % 39 4 40 2   PLATELETS Thousands/uL 304 298   NEUTROS ABS Thousands/µL 2 91 4 62         Results from last 7 days   Lab Units 03/04/21  0514 03/03/21  1020   SODIUM mmol/L 140 140   POTASSIUM mmol/L 3 9 4 3   CHLORIDE mmol/L 104 103   CO2 mmol/L 28 33*   ANION GAP mmol/L 8 4   BUN mg/dL 12 18   CREATININE mg/dL 0 68 0 79   EGFR ml/min/1 73sq m 99 85   CALCIUM mg/dL 8 8 8 9     Results from last 7 days   Lab Units 03/04/21  0514 03/03/21  1020   AST U/L 12 12   ALT U/L 16 18   ALK PHOS U/L 64 67   TOTAL PROTEIN g/dL 7 0 7 3   ALBUMIN g/dL 3 3* 3 5   TOTAL BILIRUBIN mg/dL 0 68 0 73         Results from last 7 days   Lab Units 03/04/21  0514 03/03/21  1020   GLUCOSE RANDOM mg/dL 107 154*         Results from last 7 days   Lab Units 03/03/21  1020   TROPONIN I ng/mL <0 02     Results from last 7 days   Lab Units 03/03/21  1020   D-DIMER QUANTITATIVE ug/ml FEU 4 65*     Results from last 7 days   Lab Units 03/04/21  0207 03/03/21  1815 03/03/21  1020   PROTIME seconds  --   --  13 2   INR   --   --  1 02   PTT seconds 110* 176* 27     Results from last 7 days   Lab Units 03/03/21  1337   TSH 3RD GENERATON uIU/mL 2 350     ED Treatment:   Medication Administration from 03/03/2021 1005 to 03/03/2021 1212       Date/Time Order Dose Route Action     03/03/2021 1155 heparin (porcine) injection 8,800 Units 8,800 Units Intravenous Given     03/03/2021 1155 heparin (porcine) 25,000 units in 0 45% NaCl 250 mL infusion (premix) 18 Units/kg/hr Intravenous New Bag        Past Medical History:   Diagnosis Date    DVT (deep vein thrombosis) in pregnancy        Admitting Diagnosis: Leg pain [M79 606]  Pulmonary embolism (HCC) [I26 99]  Left leg pain [M79 605]  Thrombophlebitis of superficial veins of left lower extremity [I80 02]  Age/Sex: 55 y o  female  Admission Orders:  ECHO  Heparin drip with routine PTT    Scheduled Medications:  polyethylene glycol, 17 g, Oral, Daily  senna, 1 tablet, Oral, Daily      Continuous IV Infusions:  heparin (porcine), 3-30 Units/kg/hr (Order-Specific), Intravenous, Titrated      PRN Meds:  acetaminophen, 650 mg, Oral, Q6H PRN  calcium carbonate, 1,000 mg, Oral, Daily PRN  ondansetron, 4 mg, Intravenous, Q6H PRN        IP CONSULT TO CASE MANAGEMENT    Network Utilization Review Department  ATTENTION: Please call with any questions or concerns to [phone] and carefully listen to the prompts so that you are directed to the right person  All voicemails are confidential   Arcenio Robertson all requests for admission clinical reviews, approved or denied determinations and any other requests to dedicated fax number below belonging to the campus where the patient is receiving treatment   List of dedicated fax numbers for the Facilities:  [address] DENIALS (Administrative/Medical Necessity) [phone]   [address] (Maternity/NICU/Pediatrics) [phone]   [address] [place] [address] Dr Dominique Mcghee 3061 (  Karl [facility] "[facility]isa" [address]) [address] [address] [address] [place]) [address] [place], [place] [address] [phone]

## 2021-03-04 NOTE — PROGRESS NOTES
CM was consulted for post acute placement:  Pt is in observation care with a PE on Heparin infusion  Anticipate patient will be going home on Eliquis  CM discussed Eliquis with patient and provided here the 0 copay Voucher and subsequent $10 voucher  Price of starter pack was obtained from [facility] at 100%  Patient is aware  CM met with patient at the bedside,baseline information  was obtained  CM discussed the role of CM in helping the patient develop a discharge plan and assist the patient in carry out their plan  03/04/21 1258   Patient Information   Mental Status Alert   Primary Caregiver Self   Support System Immediate family   Activities of Daily Living Prior to Admission   Functional Status Independent   Assistive Device No device needed   Living Arrangement [address] of Transport to Appts: Drive Self       Patient has identified:  Dustin Jara her spouse as her primary   He is able to assist upon discharge  No POA: No advance directive: At this time patient is not interested in receiving information  Pt verbalized Dustin Jara would be the person assisting with decisions with making if need be  PCP:        Dr Soledad Her      Pt has a prescription plan and uses [address]  Patient is Not a [place]:    Pt denies SA/MH history  Pt resides with her spouse and 2 children ages are 12 and 32    House set up: 1 story with basement  Functional level PTA: I/pta  DME use: None  Prior use of Home Care or STR: None  Transportation: does drive, and family available upon [place]: none    CM reviewed d/c planning process including the following: identifying help at home, patient preference for d/c planning needs, availability of treatment team to discuss questions or concerns patient and/or family may have regarding understanding medications and recognizing signs and symptoms once discharged    ANGELA also encouraged patient to follow up with all recommended appointments after discharge  Patient advised of importance for patient and family to participate in managing patients medical well being  Per patient she will follow up with PCP  DC plan at this time is home, no needs

## 2021-03-04 NOTE — DISCHARGE SUMMARY
Discharge- Sherrell Isaacs 1965, 55 y o  female MRN: [id number]    Unit/Bed#: -01 Encounter: [id number]    Primary Care Provider: Maria Luisa Schaffer DO   Date and time admitted to hospital: 3/3/2021 10:09 AM        * Acute pulmonary embolism without acute cor pulmonale (HCC)  Assessment & Plan  CTA chest shows few small right-sided pulmonary embolism, RV/LV ratio normal,  Venous duplex shows:Acute superficial thrombophlebitis at the SFJ (non-occlusive) and in the entireGSV  Occlusive thrombus in the GSV is 1 02 cm from the CFV  Presented with shortness of breath  Patient also has previous history DVT while she was pregnant, and patient has risk factor obesity-and sedentary lifestyle (works 12 hour shift on sitting position)  Hemodynamically remained stable  Patient is not requiring any oxygen  Echocardiogram shows normal ejection fraction 60-65%  Patient received IV heparin for 24 hours, will provide 1 dose of Eliquis 10 mg at 4:00 p m  Then will discharge the patient home  Patient is to continue Eliquis as directed-prescription already sent to patient's pharmacy  Patient also needs to follow up with PCP as soon as possible  Intensive discussion has done regarding Eliquis  Risk, benefits, side effects and alternatives discussed-patient verbalizes to understand and agrees with the treatment plan      Varicose veins of left lower extremity with pain  Assessment & Plan  Affecting left lower extremities  May benefit from outpatient follow-up with vascular surgery  May use Tylenol    H/O bariatric surgery  Assessment & Plan  History of gastric sleeve surgery in 2017  Bleeding precaution provided since patient will be on oral anticoagulation therapy due to PE  Risks, benefits, side effects and alternatives discussed her regarding the treatment and medications (Eliquis)-patient verbalizes understanding and agrees with the plan      Occlusive thrombus  Assessment & Plan  Venous duplex shows:Acute superficial thrombophlebitis at the [facility] (non-occlusive) and in the entireGSV  Occlusive thrombus in the GSV is 1 02 cm from the CFV  Discussed the case via tiger text with on-call vascular surgery-recommends to continue medical management    Class 2 severe obesity due to excess calories with serious comorbidity in adult [facility])  Assessment & Plan  Lifestyle modification  Patient had history of gastric sleeve surgery in 2017  Low-fat, low-salt diet follow nutritional consult    Shortness of breath  Assessment & Plan  Most likely secondary to pulmonary embolism  Continue treatment as per protocol with heparin drip  Echocardiogram remained normal  Patient condition significantly improved with heparin drip  Patient will be discharged with p o  Eliquis  Risks, benefits, side effects discussed in details with the patient regarding the treatment and medication  Patient verbalizes to understand and agrees          Discharging Physician / Practitioner: Chiara Del Rio MD  PCP: Nae Felder DO  Admission Date:   Admission Orders (From admission, onward)     Ordered        03/03/21 1133  Place in Observation  Once                   Discharge Date: 03/04/21    Resolved Problems  Date Reviewed: 10/3/2020    None          Consultations During Hospital Stay:  · none    Procedures Performed:   CTA ED chest PE Study   Final Result by Maury Toledo MD (03/03 1119)      Few small right-sided pulmonary emboli  Measured RV/LV ratio is within normal limits at less than 0 9  Clear lungs  I personally discussed this study with Fuentes Mooney on 3/3/2021 at 11:13 AM                Workstation performed: [id number]         XR chest 1 view portable   Final Result by Abrahan Dow MD (03/03 1119)   No acute cardiopulmonary disease        Findings are stable            Workstation performed: [id number]         ·   VAS lower limb venous duplex study, unilateral/limited:  Impression:  RIGHT LOWER LIMB LIMITED:  Evaluation shows no evidence of thrombus in the common femoral vein  Doppler evaluation shows a normal response to augmentation maneuvers  LEFT LOWER LIMB:  No evidence of acute or chronic deep vein thrombosis  Acute superficial thrombophlebitis at the SFJ (non-occlusive) and in the entire  GSV  Occlusive thrombus in the GSV is 1 02 cm from the CFV  Doppler evaluation shows a normal response to augmentation maneuvers  Popliteal, posterior tibial and anterior tibial arterial Doppler waveforms are  Triphasic  Echo complete with contrast if indicated:  LEFT VENTRICLE:  Systolic function was normal  Ejection fraction was estimated in the range of 60 % to 65 %  There were no regional wall motion abnormalities      RIGHT VENTRICLE:  The size was normal   Systolic function was normal      MITRAL VALVE:  There was trace regurgitation      TRICUSPID VALVE:  There was trace regurgitation  Significant Findings / Test Results:   Lab Results   Component Value Date    WBC 8 13 03/04/2021    HGB 13 2 03/04/2021    HCT 39 4 03/04/2021    MCV 90 03/04/2021     03/04/2021   ·   Lab Results   Component Value Date    SODIUM 140 03/04/2021    K 3 9 03/04/2021     03/04/2021    CO2 28 03/04/2021    AGAP 8 03/04/2021    BUN 12 03/04/2021    CREATININE 0 68 03/04/2021    GLUC 107 03/04/2021    CALCIUM 8 8 03/04/2021    AST 12 03/04/2021    ALT 16 03/04/2021    ALKPHOS 64 03/04/2021    TP 7 0 03/04/2021    TBILI 0 68 03/04/2021    EGFR 99 03/04/2021   ·   ·     Incidental Findings:   · As mentioned above     Test Results Pending at Discharge (will require follow up): · None     Outpatient Tests Requested:  · None    Complications:  None    Reason for Admission:  Shortness of breath    Hospital Course:     Oscar Allen is a 55 y o  female patient who originally presented to the hospital on 3/3/2021 due to shortness of breath    Patient admitted under the diagnosis of acute pulmonary embolism without acute cor pulmonale  CT chest shows few small right-sided pulmonary embolism, RV/TLC ratio normal, echocardiogram normal   Venous duplex shows acute superficial thrombophlebitis at the SFJ (nonocclusive) and in the inter GSV  Occlusive thrombus in the GSV is 1 02 cm from CFV  Patient received treatment IV heparin for 24 hours, which then stop and switch it to Eliquis 10 mg b i d  Patient received 1 dose of Eliquis 10 mg in the hospitalization, advised the patient to start Eliquis in the morning of 3/5/21  Prescription sent to patient's pharmacy  For occlusive thrombus, discussed the case via PEG a text with vascular surgery, who recommends to continue medical management  Patient also has significant varicose veins affecting left lower extremities, advised the patient to follow up with PCP and if possible vascular surgery outpatient basis  Detailed discussion has done her regarding treatment plan including anticoagulation therapy  Risks, benefits, side effects and alternatives discussed in detail and patient verbalizes to agrees and understands  Also discussed this issue with the patient's daughter over the phone, verbalizes understanding  Bleeding precaution provided  Patient advised to follow up with PCP as soon as possible  Please see above list of diagnoses and related plan for additional information  Condition at Discharge: stable     Discharge Day Visit / Exam:     Subjective:  Seen and evaluated during that time  Patient denies any significant complaint  Patient reports her breathing is much improved  Denies any chest discomfort      Vitals: Blood Pressure: 128/79 (03/04/21 1425)  Pulse: 67 (03/04/21 0708)  Temperature: 98 °F (36 7 °C) (03/04/21 1425)  Temp Source: Oral (03/03/21 2135)  Respirations: 17 (03/04/21 0708)  Height: 5' 7" (170 2 cm) (03/03/21 1014)  Weight - Scale: 113 kg (248 lb 0 3 oz) (03/03/21 1014)  SpO2: 97 % (03/04/21 0708)  Exam:   Physical Exam  Vitals signs and nursing note reviewed  Exam conducted with a chaperone present  Constitutional:       Appearance: She is obese  HENT:      Mouth/Throat:      Pharynx: No oropharyngeal exudate  Eyes:      General: No scleral icterus  Conjunctiva/sclera: Conjunctivae normal       Pupils: Pupils are equal, round, and reactive to light  Neck:      Musculoskeletal: Normal range of motion  Cardiovascular:      Rate and Rhythm: Normal rate  Heart sounds: No friction rub  No gallop  Pulmonary:      Effort: Pulmonary effort is normal  No respiratory distress  Breath sounds: No stridor  No wheezing  Abdominal:      General: Abdomen is flat  Bowel sounds are normal  There is no distension  Palpations: There is no mass  Tenderness: There is no abdominal tenderness  Hernia: No hernia is present  Musculoskeletal: Normal range of motion  Lymphadenopathy:      Cervical: No cervical adenopathy  Skin:     General: Skin is warm  Neurological:      Mental Status: She is alert and oriented to person, place, and time  Cranial Nerves: No cranial nerve deficit  Sensory: No sensory deficit  Motor: No weakness  Coordination: Coordination normal    Psychiatric:         Mood and Affect: Mood normal          Discussion with Family: yes-with daughter    Discharge instructions/Information to patient and family:   See after visit summary for information provided to patient and family  Provisions for Follow-Up Care:  See after visit summary for information related to follow-up care and any pertinent home health orders  Disposition:     Home    For Discharges to [facility] SNF:   · Not Applicable to this Patient - Not Applicable to this Patient    Planned Readmission:  If condition gets worse     Discharge Statement:  I spent >35 minutes discharging the patient  This time was spent on the day of discharge   I had direct contact with the patient on the day of discharge  Greater than 50% of the total time was spent examining patient, answering all patient questions, arranging and discussing plan of care with patient as well as directly providing post-discharge instructions  Additional time then spent on discharge activities  Discharge Medications:  See after visit summary for reconciled discharge medications provided to patient and family        ** Please Note: This note has been constructed using a voice recognition system **

## 2021-03-04 NOTE — ASSESSMENT & PLAN NOTE
CTA chest shows few small right-sided pulmonary embolism, RV/LV ratio normal,  Venous duplex shows:Acute superficial thrombophlebitis at the SFJ (non-occlusive) and in the entireGSV  Occlusive thrombus in the GSV is 1 02 cm from the CFV  Presented with shortness of breath  Patient also has previous history DVT while she was pregnant, and patient has risk factor obesity-and sedentary lifestyle (works 12 hour shift on sitting position)  Hemodynamically remained stable  Patient is not requiring any oxygen  Echocardiogram shows normal ejection fraction 60-65%  Patient received IV heparin for 24 hours, will provide 1 dose of Eliquis 10 mg at 4:00 p m  Then will discharge the patient home  Patient is to continue Eliquis as directed-prescription already sent to patient's pharmacy  Patient also needs to follow up with PCP as soon as possible  Intensive discussion has done regarding Eliquis  Risk, benefits, side effects and alternatives discussed-patient verbalizes to understand and agrees with the treatment plan

## 2021-03-04 NOTE — ASSESSMENT & PLAN NOTE
Affecting left lower extremities  May benefit from outpatient follow-up with vascular surgery  May use Tylenol

## 2021-03-05 LAB
B2 GLYCOPROT1 IGA SER-ACNC: <9 GPI IGA UNITS (ref 0–25)
B2 GLYCOPROT1 IGG SER-ACNC: <9 GPI IGG UNITS (ref 0–20)
B2 GLYCOPROT1 IGM SER-ACNC: <9 GPI IGM UNITS (ref 0–32)

## 2021-03-07 LAB
ATRIAL RATE: 68 BPM
P AXIS: 63 DEGREES
PR INTERVAL: 148 MS
PROT S ACT/NOR PPP: 87 % (ref 57–157)
PROT S PPP-ACNC: 89 % (ref 60–150)
QRS AXIS: 41 DEGREES
QRSD INTERVAL: 100 MS
QT INTERVAL: 398 MS
QTC INTERVAL: 423 MS
T WAVE AXIS: 44 DEGREES
VENTRICULAR RATE: 68 BPM

## 2021-03-07 PROCEDURE — 93010 ELECTROCARDIOGRAM REPORT: CPT | Performed by: INTERNAL MEDICINE

## 2021-03-08 LAB
F2 GENE MUT ANL BLD/T: NORMAL
F5 GENE MUT ANL BLD/T: ABNORMAL

## 2021-03-09 LAB
APTT SCREEN TO CONFIRM RATIO: 0.88 RATIO (ref 0–1.4)
CONFIRM APTT/NORMAL: 57.3 SEC (ref 0–55)
DRVVT IMM 1:2 NP PPP: 43.9 SEC (ref 0–40.4)
DRVVT SCREEN TO CONFIRM RATIO: 1.1 RATIO (ref 0.8–1.2)
LA PPP-IMP: ABNORMAL
SCREEN APTT: 39.1 SEC (ref 0–51.9)
SCREEN DRVVT: 54.8 SEC (ref 0–47)
THROMBIN TIME: >150 SEC (ref 0–23)
TT IMM NP PPP: >150 SEC (ref 0–23)
TT P HPASE PPP: 18.9 SEC (ref 0–23)

## 2021-07-15 ENCOUNTER — DOCTOR'S OFFICE (OUTPATIENT)
Dept: URBAN - NONMETROPOLITAN AREA CLINIC 1 | Facility: CLINIC | Age: 56
Setting detail: OPHTHALMOLOGY
End: 2021-07-15
Payer: COMMERCIAL

## 2021-07-15 DIAGNOSIS — H52.4: ICD-10-CM

## 2021-07-15 PROBLEM — H35.371 PUCKERING OF MACULA; RIGHT EYE: Status: ACTIVE | Noted: 2021-07-15

## 2021-07-15 PROBLEM — H40.013 GLAUCOMA SUSPECT, LOW RISK; BOTH EYES: Status: ACTIVE | Noted: 2021-07-15

## 2021-07-15 PROBLEM — H25.13 NUCLEAR CATARACT NUCLEAR SCLEROSIS; BOTH EYES: Status: ACTIVE | Noted: 2021-07-15

## 2021-07-15 PROCEDURE — 92004 COMPRE OPH EXAM NEW PT 1/>: CPT | Performed by: OPTOMETRIST

## 2021-07-15 PROCEDURE — 92015 DETERMINE REFRACTIVE STATE: CPT | Performed by: OPTOMETRIST

## 2021-07-15 ASSESSMENT — VISUAL ACUITY
OS_BCVA: 20/50-1
OD_BCVA: 20/25-1

## 2021-07-15 ASSESSMENT — TONOMETRY
OS_IOP_MMHG: 16
OD_IOP_MMHG: 16

## 2021-07-15 ASSESSMENT — REFRACTION_AUTOREFRACTION
OS_AXIS: 97
OD_SPHERE: +0.25
OD_CYLINDER: -3.75
OS_SPHERE: 0.00
OD_AXIS: 75
OS_CYLINDER: -1.50

## 2021-07-15 ASSESSMENT — MACULA - EPIRETINAL MEMBRANE (ERM): OD_ERM: 1+

## 2021-07-15 ASSESSMENT — REFRACTION_MANIFEST
OS_CYLINDER: -1.50
OS_ADD: +2.25
OS_SPHERE: PL
OD_VA1: 20/40
OD_ADD: +2.25
OD_CYLINDER: -3.50
OS_VA1: 20/25+
OD_AXIS: 072
OS_AXIS: 092
OD_SPHERE: PL

## 2021-07-15 ASSESSMENT — REFRACTION_CURRENTRX
OD_AXIS: 68
OD_CYLINDER: -3.00
OS_CYLINDER: -1.50
OS_OVR_VA: 20/
OD_OVR_VA: 20/
OD_SPHERE: -0.50
OS_SPHERE: PLANO
OS_AXIS: 87

## 2021-07-15 ASSESSMENT — SPHEQUIV_DERIVED
OD_SPHEQUIV: -1.625
OS_SPHEQUIV: -0.75

## 2021-07-19 ENCOUNTER — OPTICAL OFFICE (OUTPATIENT)
Dept: URBAN - NONMETROPOLITAN AREA CLINIC 4 | Facility: CLINIC | Age: 56
Setting detail: OPHTHALMOLOGY
End: 2021-07-19
Payer: COMMERCIAL

## 2021-07-19 DIAGNOSIS — H52.223: ICD-10-CM

## 2021-07-19 PROCEDURE — V2020 VISION SVCS FRAMES PURCHASES: HCPCS | Performed by: OPTOMETRIST

## 2021-07-19 PROCEDURE — V2781 PROGRESSIVE LENS PER LENS: HCPCS | Performed by: OPTOMETRIST

## 2022-07-04 ENCOUNTER — HOSPITAL ENCOUNTER (EMERGENCY)
Facility: HOSPITAL | Age: 57
Discharge: HOME | End: 2022-07-04
Attending: EMERGENCY MEDICINE | Admitting: EMERGENCY MEDICINE
Payer: OTHER MISCELLANEOUS

## 2022-07-04 ENCOUNTER — APPOINTMENT (EMERGENCY)
Dept: CT IMAGING | Facility: HOSPITAL | Age: 57
End: 2022-07-04
Payer: OTHER MISCELLANEOUS

## 2022-07-04 VITALS
DIASTOLIC BLOOD PRESSURE: 79 MMHG | HEIGHT: 67 IN | TEMPERATURE: 97.4 F | OXYGEN SATURATION: 98 % | WEIGHT: 248 LBS | SYSTOLIC BLOOD PRESSURE: 142 MMHG | HEART RATE: 75 BPM | RESPIRATION RATE: 15 BRPM | BODY MASS INDEX: 38.92 KG/M2

## 2022-07-04 DIAGNOSIS — S09.90XA INJURY OF HEAD, INITIAL ENCOUNTER: ICD-10-CM
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
## 2022-07-04 PROCEDURE — 99284 EMERGENCY DEPT VISIT MOD MDM: CPT

## 2022-07-04 PROCEDURE — 70450 CT HEAD/BRAIN W/O DYE: CPT

## 2022-07-04 PROCEDURE — 99284 EMERGENCY DEPT VISIT MOD MDM: CPT | Performed by: EMERGENCY MEDICINE

## 2022-07-04 NOTE — Clinical Note
Олег Worthington was seen and treated in our emergency department on 7/4/2022  Diagnosis:     Migue Daniel    She may return on this date: If you have any questions or concerns, please don't hesitate to call        So Torres DO    ______________________________           _______________          _______________  Hospital Representative                              Date                                Time

## 2022-07-04 NOTE — Clinical Note
Jair Chowdhury was seen and treated in our emergency department on 7/4/2022  Diagnosis:     Isidro Pena  may return to work on return date  She may return on this date: 07/05/2022         If you have any questions or concerns, please don't hesitate to call        Tom Noriega DO    ______________________________           _______________          _______________  Hospital Representative                              Date                                Time

## 2022-07-04 NOTE — Clinical Note
Meg Hodge was seen and treated in our emergency department on 7/4/2022  Diagnosis:     Christie Gregory    She may return on this date: If you have any questions or concerns, please don't hesitate to call        Cristine Cm DO    ______________________________           _______________          _______________  Hospital Representative                              Date                                Time

## 2022-07-05 NOTE — ED PROVIDER NOTES
Emergency Department Trauma Note  Meg Hodge 65 y o  female MRN: [id number]  Unit/Bed#: ED TR 13/TR 13A Encounter: [id number]      Trauma Alert: Trauma Acuity: Trauma Evaluation  Model of Arrival: Mode of Arrival: Direct from scene via    Trauma Team: Current Providers  Attending Provider: Cristine Cm DO  Charge Nurse: Deisi Dhillon RN  Registered Nurse: Minal Damon RN  Consultants:     None      History of Present Illness     Chief Complaint:   Chief Complaint   Patient presents with    Fall     At 0100 pt went to sit and the chair moved  Pt was at work  HPI:  Meg Hodge is a 65 y o  female who presents with   Mechanism:Details of Incident: fall from chair  Injury Date: 07/03/22 Injury Time: 0100 Injury Occurence Location - [address] [place] [address] Way: [facility] 60-year-old female presents emergency room with chief complaint of a headache and some dizziness and some mild nausea which has resolved associated with some blurred vision which has also resolved after falling and striking her head on the floor while at work approximately 18 hours prior to arrival   Patient reports that she was at work around 1:00 a m  In the morning when her chair slipped causing her to fall and strike her right side of her head against the ground  There was no reported loss of consciousness  Patient went home and went to bed and when she awoke around 1 or 2 in the afternoon she noticed that she was having some persistent headache and dizziness  She went to work and noted some blurred vision when looking at the computer  She reported this to work and she was sent for evaluation  Patient is on Eliquis secondary to a history of multiple DVTs and pulmonary embolisms  Currently in the emergency room patient is complaining of some mild persistent headache        History provided by:  Patient  Fall  Mechanism of injury: fall    Injury location:  Head/neck  Head/neck injury location:  Head  Incident location: Work  Time since incident:  18 hours  Arrived directly from scene: no    Fall:     Fall occurred: From a work chair  Height of fall:  Sitting    Impact surface:  Hard floor    Entrapped after fall: no    Suspicion of alcohol use: no    Suspicion of drug use: no    Prior to arrival data: Airway condition since incident:  Stable    Breathing condition since incident:  Stable    Circulation condition since incident:  Stable    Mental status condition since incident:  Stable    Disability condition since incident:  Stable  Associated symptoms: headaches and nausea    Associated symptoms: no abdominal pain, no back pain, no chest pain, no difficulty breathing and no vomiting    Headaches:     Severity:  Mild  Risk factors: anticoagulation therapy      Review of Systems   Constitutional: Negative  HENT: Negative  Eyes: Positive for visual disturbance (Resolved)  Respiratory: Negative  Cardiovascular: Negative  Negative for chest pain  Gastrointestinal: Positive for nausea  Negative for abdominal pain and vomiting  Genitourinary: Negative  Musculoskeletal: Negative for back pain and gait problem  Neurological: Positive for dizziness and headaches  Negative for tremors, speech difficulty, weakness, light-headedness and numbness  All other systems reviewed and are negative  Historical Information     Immunizations: There is no immunization history on file for this patient  Past Medical History:   Diagnosis Date    DVT (deep vein thrombosis) in pregnancy      History reviewed  No pertinent family history    Past Surgical History:   Procedure Laterality Date    ABDOMINAL SURGERY      Gastric Sleeve      SECTION       Social History     Tobacco Use    Smoking status: Former Smoker     Types: Cigarettes     Quit date:      Years since quittin 5    Smokeless tobacco: Never Used   Vaping Use    Vaping Use: Never used   Substance Use Topics    Alcohol use: Never    Drug use: Never     E-Cigarette/Vaping    E-Cigarette Use Never User      E-Cigarette/Vaping Substances       Family History: non-contributory    Meds/Allergies   Prior to Admission Medications   Prescriptions Last Dose Informant Patient Reported? Taking? Multiple Vitamins-Minerals (Multivitamin Adult Extra C) CHEW   Yes No   Sig: Chew 2 each   apixaban (ELIQUIS) 5 mg   No No   Sig: Take 1 tablet (5 mg total) by mouth 2 (two) times a day   ergocalciferol (VITAMIN D2) 50,000 units   Yes No   Sig: TAKE 2 CAPSULES BY MOUTH ONCE A WEEK   fexofenadine (ALLEGRA) 180 MG tablet   Yes No   Sig: Take 180 mg by mouth      Facility-Administered Medications: None       No Known Allergies    PHYSICAL EXAM        Objective   Vitals:   First set: Temperature: (!) 97 4 °F (36 3 °C) (07/04/22 2018)  Pulse: 85 (07/04/22 2018)  Respirations: 16 (07/04/22 2018)  Blood Pressure: (!) 200/90 (07/04/22 2018)  SpO2: 99 % (07/04/22 2018)    Primary Survey:   (A) Airway:  Normal  (B) Breathing:  Normal  (C) Circulation: Pulses:   normal  (D) Disabliity:  GCS Total:  15  (E) Expose:  Completed    Secondary Survey: (Click on Physical Exam tab above)  Physical Exam  Vitals and nursing note reviewed  Constitutional:       General: She is awake  Appearance: Normal appearance  She is well-developed  She is not toxic-appearing  HENT:      Head: Normocephalic and atraumatic  Hair is normal       Jaw: No pain on movement  Right Ear: External ear normal       Left Ear: External ear normal       Nose: Nose normal    Eyes:      General: Lids are normal  No scleral icterus  Extraocular Movements: Extraocular movements intact  Pupils: Pupils are equal, round, and reactive to light  Cardiovascular:      Rate and Rhythm: Normal rate and regular rhythm  Heart sounds: Normal heart sounds  No murmur heard  Pulmonary:      Effort: Pulmonary effort is normal  No respiratory distress  Breath sounds: Normal breath sounds   No stridor  No wheezing or rales  Abdominal:      General: Abdomen is flat  There is no distension  Palpations: Abdomen is soft  Tenderness: There is no abdominal tenderness  There is no guarding  Musculoskeletal:         General: No deformity  Normal range of motion  Cervical back: Normal range of motion and neck supple  Skin:     General: Skin is warm and dry  Coloration: Skin is not jaundiced or pale  Findings: No rash  Neurological:      Mental Status: She is alert and oriented to person, place, and time  Mental status is at baseline  Cranial Nerves: No cranial nerve deficit  Psychiatric:         Attention and Perception: Attention normal          Mood and Affect: Mood normal          Speech: Speech normal          Behavior: Behavior normal          Cervical spine cleared by clinical criteria? Yes    Invasive Devices  Report    None                 Lab Results:   Results Reviewed     None                 Imaging Studies:   Direct to CT: Yes  TRAUMA - CT head wo contrast   Final Result by Caitlyn Martínez MD (07/04 2115)      No acute intracranial abnormality  Workstation performed: [id number]               Procedures  Procedures         ED Course  ED Course as of 07/04/22 2301   Mon Jul 04, 2022 2154 CT the head negative for acute process  Patient stable for discharge  Follow-up with occupational health           MDM  Number of Diagnoses or Management Options  Fall, initial encounter: new and requires workup  Injury of head, initial encounter: new and requires workup  Diagnosis management comments: CXR was not performed as this was isolated head injury  Pelvic plain film was not performed as this was isolated head injury    Patient deemed stable to proceed to any necessary CT imaging      Cervical spine was cleared clinically         Amount and/or Complexity of Data Reviewed  Tests in the radiology section of CPT®: ordered and reviewed    Risk of Complications, Morbidity, and/or Mortality  Presenting problems: moderate  Diagnostic procedures: moderate  Management options: moderate    Patient Progress  Patient progress: stable          Disposition  [facility] Transfer: No  Final diagnoses:   Fall, initial encounter   Injury of head, initial encounter     Time reflects when diagnosis was documented in both MDM as applicable and the Disposition within this note     Time User Action Codes Description Comment    7/4/2022  8:43 PM Gaetano Snyder Ronny Gut  KW[facility]] Fall, initial encounter     7/4/2022  8:43 PM Gaetano Collins Add [[id number]] Injury of head, initial encounter       ED Disposition     ED Disposition   Discharge    Condition   Stable    Date/Time   Mon Jul 4, 2022  9:54 PM    Comment   Meg Hodge discharge to home/self care  Follow-up Information    None       Discharge Medication List as of 7/4/2022  9:55 PM      CONTINUE these medications which have NOT CHANGED    Details   apixaban (ELIQUIS) 5 mg Take 1 tablet (5 mg total) by mouth 2 (two) times a day, Starting Thu 3/4/2021, Until Sat 4/3/2021, Normal      ergocalciferol (VITAMIN D2) 50,000 units TAKE 2 CAPSULES BY MOUTH ONCE A WEEK, Historical Med      fexofenadine (ALLEGRA) 180 MG tablet Take 180 mg by mouth, Historical Med      Multiple Vitamins-Minerals (Multivitamin Adult Extra C) CHEW Chew 2 each, Historical Med           No discharge procedures on file      PDMP Review       Value Time User    PDMP Reviewed  Yes 3/3/2021 12:32 PM Shamika Dominguez MD          ED Provider  Electronically Signed by         Cristine Cm DO  07/04/22 5172

## 2022-07-25 ENCOUNTER — HOSPITAL ENCOUNTER (EMERGENCY)
Facility: HOSPITAL | Age: 57
Discharge: HOME | End: 2022-07-25
Attending: STUDENT IN AN ORGANIZED HEALTH CARE EDUCATION/TRAINING PROGRAM | Admitting: STUDENT IN AN ORGANIZED HEALTH CARE EDUCATION/TRAINING PROGRAM
Payer: COMMERCIAL

## 2022-07-25 VITALS
HEART RATE: 101 BPM | DIASTOLIC BLOOD PRESSURE: 95 MMHG | OXYGEN SATURATION: 97 % | RESPIRATION RATE: 18 BRPM | TEMPERATURE: 98.4 F | SYSTOLIC BLOOD PRESSURE: 162 MMHG

## 2022-07-25 DIAGNOSIS — L03.211 FACIAL CELLULITIS: Primary | ICD-10-CM

## 2022-07-25 DIAGNOSIS — L03.114 LEFT ARM CELLULITIS: ICD-10-CM

## 2022-07-25 DIAGNOSIS — L30.9 DERMATITIS OF MULTIPLE SITES: ICD-10-CM

## 2022-07-25 PROCEDURE — 99284 EMERGENCY DEPT VISIT MOD MDM: CPT | Performed by: STUDENT IN AN ORGANIZED HEALTH CARE EDUCATION/TRAINING PROGRAM

## 2022-07-25 PROCEDURE — 99283 EMERGENCY DEPT VISIT LOW MDM: CPT

## 2022-07-25 RX ORDER — CEPHALEXIN 250 MG/1
500 CAPSULE ORAL ONCE
Status: COMPLETED | OUTPATIENT
Start: 2022-07-25 | End: 2022-07-25

## 2022-07-25 RX ORDER — CEPHALEXIN 500 MG/1
500 CAPSULE ORAL EVERY 6 HOURS SCHEDULED
Qty: 19 CAPSULE | Refills: 0 | Status: SHIPPED | OUTPATIENT
Start: 2022-07-25 | End: 2022-07-30

## 2022-07-25 RX ORDER — ACETAMINOPHEN 325 MG/1
975 TABLET ORAL ONCE
Status: COMPLETED | OUTPATIENT
Start: 2022-07-25 | End: 2022-07-25

## 2022-07-25 RX ORDER — DIAPER,BRIEF,INFANT-TODD,DISP
EACH MISCELLANEOUS ONCE
Status: DISCONTINUED | OUTPATIENT
Start: 2022-07-25 | End: 2022-07-25

## 2022-07-25 RX ADMIN — DEXAMETHASONE SODIUM PHOSPHATE 10 MG: 10 INJECTION, SOLUTION INTRAMUSCULAR; INTRAVENOUS at 16:06

## 2022-07-25 RX ADMIN — CEPHALEXIN 500 MG: 250 CAPSULE ORAL at 16:05

## 2022-07-25 RX ADMIN — ACETAMINOPHEN 975 MG: 325 TABLET ORAL at 16:06

## 2022-07-25 NOTE — ED PROVIDER NOTES
History  Chief Complaint   Patient presents with    Headache     Pt arrives from home with c/o headache, nausea starting yesterday  Pt woke up today with right eye redness and swelling  Pt also has two small open areas on left forearm starting this AM         Headache  Pain location:  Frontal  Quality:  Dull  Radiates to:  Does not radiate  Severity currently:  4/10  Severity at highest:  8/10  Onset quality:  Gradual  Duration:  1 day  Timing:  Constant  Progression:  Improving  Chronicity:  New  Similar to prior headaches: no    Context comment:  Normal activity  Relieved by:  Acetaminophen  Worsened by: Activity  Associated symptoms: nausea, near-syncope and photophobia    Associated symptoms: no abdominal pain, no back pain, no blurred vision, no congestion, no cough, no diarrhea, no dizziness, no drainage, no ear pain, no eye pain, no facial pain, no fatigue, no fever, no myalgias, no neck pain, no paresthesias, no seizures, no sinus pressure, no sore throat, no swollen glands, no syncope, no URI, no visual change, no vomiting and no weakness       65year old F  Presents to the ED with headache/nausea, right eye redness and swelling  The patient noticed the right redness/swelling this morning but the headache has been persistent for the past 24 hours  Denies vomiting, fevers/chills  Tylenol hasn't been helping much but states that her headache has been gradually improving  The patients adds that she was gardening yesterday but denies touching her eyes, chest, neck  Prior to Admission Medications   Prescriptions Last Dose Informant Patient Reported? Taking?    Multiple Vitamins-Minerals (Multivitamin Adult Extra C) CHEW   Yes No   Sig: Chew 2 each   apixaban (ELIQUIS) 5 mg   No No   Sig: Take 1 tablet (5 mg total) by mouth 2 (two) times a day   ergocalciferol (VITAMIN D2) 50,000 units   Yes No   Sig: TAKE 2 CAPSULES BY MOUTH ONCE A WEEK   fexofenadine (ALLEGRA) 180 MG tablet   Yes No   Sig: Take 180 mg by mouth      Facility-Administered Medications: None     Past Medical History:   Diagnosis Date    DVT (deep vein thrombosis) in pregnancy      Past Surgical History:   Procedure Laterality Date    ABDOMINAL SURGERY      Gastric Sleeve      SECTION       History reviewed  No pertinent family history  I have reviewed and agree with the history as documented  E-Cigarette/Vaping    E-Cigarette Use Never User      E-Cigarette/Vaping Substances     Social History     Tobacco Use    Smoking status: Former Smoker     Types: Cigarettes     Quit date:      Years since quittin 5    Smokeless tobacco: Never Used   Vaping Use    Vaping Use: Never used   Substance Use Topics    Alcohol use: Never    Drug use: Never     Review of Systems   Constitutional: Negative for activity change, appetite change, chills, fatigue and fever  HENT: Negative for congestion, ear pain, postnasal drip, sinus pressure and sore throat  Eyes: Positive for photophobia  Negative for blurred vision, pain, discharge, redness, itching and visual disturbance  Respiratory: Negative for cough, chest tightness and shortness of breath  Cardiovascular: Positive for near-syncope  Negative for chest pain, palpitations and syncope  Gastrointestinal: Positive for nausea  Negative for abdominal pain, diarrhea and vomiting  Musculoskeletal: Negative for back pain, myalgias and neck pain  Skin: Positive for color change and rash  Negative for pallor and wound  Neurological: Positive for headaches  Negative for dizziness, seizures, syncope, weakness, light-headedness and paresthesias  Hematological: Does not bruise/bleed easily  Psychiatric/Behavioral: Negative for confusion  All other systems reviewed and are negative  Physical Exam  Physical Exam  Vitals and nursing note reviewed  Constitutional:       General: She is not in acute distress  Appearance: She is not ill-appearing or toxic-appearing  Comments: Elevated BMI   HENT:      Head: Normocephalic and atraumatic  Comments: Erythematous/raised/pruritic rash along the right postauricular region  Erythematous/raised area along the inferior aspect of the right eye  There are no changes along the eyelids  The area is mildly edematous  Right Ear: External ear normal       Left Ear: External ear normal       Nose: No congestion or rhinorrhea  Mouth/Throat:      Mouth: Mucous membranes are moist       Pharynx: Oropharynx is clear  Eyes:      General: No scleral icterus  Right eye: No discharge  Left eye: No discharge  Extraocular Movements: Extraocular movements intact  Conjunctiva/sclera: Conjunctivae normal       Pupils: Pupils are equal, round, and reactive to light  Comments: No pain with extraocular eye movements  No signs of scleritis or chemosis  Neck:        Comments: Erythematous, raised, linear, pruritic rash along the right upper chest   Cardiovascular:      Rate and Rhythm: Normal rate and regular rhythm  Pulses: Normal pulses  Heart sounds: Normal heart sounds  No murmur heard  Pulmonary:      Effort: Pulmonary effort is normal  No respiratory distress  Breath sounds: Normal breath sounds  No stridor  No wheezing, rhonchi or rales  Chest:      Chest wall: No tenderness  Abdominal:      General: Bowel sounds are normal       Palpations: Abdomen is soft  Tenderness: There is no abdominal tenderness  There is no right CVA tenderness, left CVA tenderness, guarding or rebound  Musculoskeletal:         General: Swelling and tenderness present  Arms:       Cervical back: Normal range of motion and neck supple  No tenderness  Comments: Two small wounds along the volar aspect of the left lower arm with surrounding cellulitis  No purulent drainage noted  Mild tenderness to palpation  No palpable induration  Skin:     General: Skin is warm and dry        Capillary Refill: Capillary refill takes less than 2 seconds  Coloration: Skin is not jaundiced or pale  Findings: Erythema and rash present  No bruising or lesion  Neurological:      General: No focal deficit present  Mental Status: She is alert and oriented to person, place, and time  Mental status is at baseline  Cranial Nerves: No cranial nerve deficit  Sensory: No sensory deficit  Motor: No weakness  Psychiatric:         Mood and Affect: Mood normal          Behavior: Behavior normal          Thought Content: Thought content normal          Judgment: Judgment normal        Vital Signs  ED Triage Vitals [07/25/22 1336]   Temperature Pulse Respirations Blood Pressure SpO2   98 4 °F (36 9 °C) 101 18 162/95 97 %      Temp Source Heart Rate Source Patient Position - Orthostatic VS BP Location FiO2 (%)   Temporal Monitor Sitting Right arm --      Pain Score       7         Vitals:    07/25/22 1336   BP: 162/95   Pulse: 101   Patient Position - Orthostatic VS: Sitting     ED Medications  Medications   acetaminophen (TYLENOL) tablet 975 mg (975 mg Oral Given 7/25/22 1606)   dexamethasone oral liquid 10 mg 1 mL (10 mg Oral Given 7/25/22 1606)   cephalexin (KEFLEX) capsule 500 mg (500 mg Oral Given 7/25/22 1605)     Diagnostic Studies  Results Reviewed     None             No orders to display          Procedures  Procedures   ED Course  ED Course as of 07/25/22 1900   Mon Jul 25, 2022   1601 Rash along the inferior aspect of the right eye concerning for possible cellulitis verses allergic reaction  The patient has a similar rash along her right chest/clavicular area which may be due to a dermatitis of unknown origin  The patient states that the rash is pruritic  No signs of orbital cellulitis  Extraocular eye movements are intact without pain  Denies fever/chills  The patient has smaller areas of cellulitis along the left forearm   Given the patient's multiple clinical exam findings, he was prescribed a course of Keflex  A dose of Decadron was administered in the emergency department for possible allergic/contact dermatitis  Strict return precautions were discussed  All questions were addressed  PCP follow up recommended  Stable for discharge     SBIRT 22yo+    Flowsheet Row Most Recent Value   SBIRT (25 yo +)    In order to provide better care to our patients, we are screening all of our patients for alcohol and drug use  Would it be okay to ask you these screening questions? Unable to answer at this time Filed at: 07/25/2022 1503        MDM    Disposition  Final diagnoses:   Facial cellulitis   Dermatitis of multiple sites   Left arm cellulitis     Time reflects when diagnosis was documented in both MDM as applicable and the Disposition within this note     Time User Action Codes Description Comment    7/25/2022  3:52 PM [place] [facility] Add [H29 433] Facial cellulitis     7/25/2022  3:52 PM Andrés Hearing Add [L30 9] Dermatitis of multiple sites     7/25/2022  3:52 PM Andrés Hearing Add [S12 602] Left arm cellulitis       ED Disposition     ED Disposition   Discharge    Condition   Stable    Date/Time   Mon Jul 25, 2022  3:52 PM    Comment   Felipe Mora discharge to home/self care                 Follow-up Information    None         Discharge Medication List as of 7/25/2022  3:59 PM      START taking these medications    Details   cephalexin (KEFLEX) 500 mg capsule Take 1 capsule (500 mg total) by mouth every 6 (six) hours for 5 days, Starting Mon 7/25/2022, Until Sat 7/30/2022, Normal         CONTINUE these medications which have NOT CHANGED    Details   apixaban (ELIQUIS) 5 mg Take 1 tablet (5 mg total) by mouth 2 (two) times a day, Starting Thu 3/4/2021, Until Sat 4/3/2021, Normal      ergocalciferol (VITAMIN D2) 50,000 units TAKE 2 CAPSULES BY MOUTH ONCE A WEEK, Historical Med      fexofenadine (ALLEGRA) 180 MG tablet Take 180 mg by mouth, Historical Med      Multiple Vitamins-Minerals (Multivitamin Adult Extra C) CHEW Chew 2 each, Historical Med             No discharge procedures on file      PDMP Review       Value Time User    PDMP Reviewed  Yes 3/3/2021 12:32 PM Lauren Franklin MD          ED Provider  Electronically Signed by           Haim Balderas DO  07/25/22 6576

## 2022-07-25 NOTE — DISCHARGE INSTRUCTIONS
You are being prescribed a course of Keflex for treatment of cellulitis  Please take as directed and follow-up with your primary care physician  For pain you can take Tylenol 1000 mg every 6 hours  You can apply topical hydrocortisone cream to the areas that itch but the decadron (administered in the ED) should improve your symptoms  Do not hesitate to be re-evaluated in the ED for any concerning signs or symptoms

## 2022-07-25 NOTE — Clinical Note
Felipe Mora was seen and treated in our emergency department on 7/25/2022  Diagnosis:     Kostas Mcginnis    She may return on this date:     Please excuse Ms Felipe Mora from work on 7/25     If you have any questions or concerns, please don't hesitate to call        Radha Ireland DO    ______________________________           _______________          _______________  Hospital Representative                              Date                                Time

## 2023-02-08 ENCOUNTER — APPOINTMENT (INPATIENT)
Dept: MRI IMAGING | Facility: HOSPITAL | Age: 58
End: 2023-02-08

## 2023-02-08 ENCOUNTER — APPOINTMENT (EMERGENCY)
Dept: CT IMAGING | Facility: HOSPITAL | Age: 58
End: 2023-02-08

## 2023-02-08 ENCOUNTER — HOSPITAL ENCOUNTER (INPATIENT)
Facility: HOSPITAL | Age: 58
LOS: 1 days | Discharge: HOME | End: 2023-02-09
Attending: EMERGENCY MEDICINE | Admitting: FAMILY MEDICINE

## 2023-02-08 DIAGNOSIS — I10 HYPERTENSION, UNSPECIFIED TYPE: ICD-10-CM

## 2023-02-08 DIAGNOSIS — R42 DIZZINESS: Primary | ICD-10-CM

## 2023-02-08 DIAGNOSIS — R47.81 SLURRED SPEECH: ICD-10-CM

## 2023-02-08 DIAGNOSIS — E04.1 THYROID NODULE: ICD-10-CM

## 2023-02-08 DIAGNOSIS — I26.99 ACUTE PULMONARY EMBOLISM WITHOUT ACUTE COR PULMONALE, UNSPECIFIED PULMONARY EMBOLISM TYPE (HCC): ICD-10-CM

## 2023-02-08 DIAGNOSIS — E11.9 DMII (DIABETES MELLITUS, TYPE 2) (HCC): ICD-10-CM

## 2023-02-08 PROBLEM — R29.90 STROKE-LIKE SYMPTOMS: Status: ACTIVE | Noted: 2023-02-08

## 2023-02-08 PROBLEM — Z86.711 HISTORY OF PULMONARY EMBOLISM: Status: ACTIVE | Noted: 2021-03-03

## 2023-02-08 LAB
ALBUMIN SERPL BCP-MCNC: 4.1 G/DL (ref 3.5–5)
ALP SERPL-CCNC: 59 U/L (ref 34–104)
ALT SERPL W P-5'-P-CCNC: 13 U/L (ref 7–52)
ANION GAP SERPL CALCULATED.3IONS-SCNC: 5 MMOL/L (ref 4–13)
APTT PPP: 25 SECONDS (ref 23–37)
AST SERPL W P-5'-P-CCNC: 11 U/L (ref 13–39)
BILIRUB DIRECT SERPL-MCNC: 0.1 MG/DL (ref 0–0.2)
BILIRUB SERPL-MCNC: 0.96 MG/DL (ref 0.2–1)
BUN SERPL-MCNC: 13 MG/DL (ref 5–25)
CALCIUM SERPL-MCNC: 9.3 MG/DL (ref 8.4–10.2)
CARDIAC TROPONIN I PNL SERPL HS: 3 NG/L
CHLORIDE SERPL-SCNC: 104 MMOL/L (ref 96–108)
CO2 SERPL-SCNC: 30 MMOL/L (ref 21–32)
CREAT SERPL-MCNC: 0.74 MG/DL (ref 0.6–1.3)
ERYTHROCYTE [DISTWIDTH] IN BLOOD BY AUTOMATED COUNT: 13.3 % (ref 11.6–15.1)
FLUAV RNA RESP QL NAA+PROBE: NEGATIVE
FLUBV RNA RESP QL NAA+PROBE: NEGATIVE
GFR SERPL CREATININE-BSD FRML MDRD: 90 ML/MIN/1.73SQ M
GLUCOSE SERPL-MCNC: 199 MG/DL (ref 65–140)
HCT VFR BLD AUTO: 41.3 % (ref 34.8–46.1)
HGB BLD-MCNC: 13.7 G/DL (ref 11.5–15.4)
INR PPP: 1.01 (ref 0.84–1.19)
MCH RBC QN AUTO: 30 PG (ref 26.8–34.3)
MCHC RBC AUTO-ENTMCNC: 33.2 G/DL (ref 31.4–37.4)
MCV RBC AUTO: 91 FL (ref 82–98)
PLATELET # BLD AUTO: 321 THOUSANDS/UL (ref 149–390)
PMV BLD AUTO: 10 FL (ref 8.9–12.7)
POTASSIUM SERPL-SCNC: 4 MMOL/L (ref 3.5–5.3)
PROT SERPL-MCNC: 7 G/DL (ref 6.4–8.4)
PROTHROMBIN TIME: 13.5 SECONDS (ref 11.6–14.5)
RBC # BLD AUTO: 4.56 MILLION/UL (ref 3.81–5.12)
RSV RNA RESP QL NAA+PROBE: NEGATIVE
SARS-COV-2 RNA RESP QL NAA+PROBE: NEGATIVE
SODIUM SERPL-SCNC: 139 MMOL/L (ref 135–147)
WBC # BLD AUTO: 8.01 THOUSAND/UL (ref 4.31–10.16)

## 2023-02-08 RX ORDER — ASCORBATE CALCIUM 500 MG
500 TABLET ORAL DAILY
COMMUNITY
End: 2023-02-09

## 2023-02-08 RX ORDER — HYDRALAZINE HYDROCHLORIDE 20 MG/ML
10 INJECTION INTRAMUSCULAR; INTRAVENOUS ONCE
Status: DISCONTINUED | OUTPATIENT
Start: 2023-02-08 | End: 2023-02-08

## 2023-02-08 RX ORDER — ALBUTEROL SULFATE 90 UG/1
2 AEROSOL, METERED RESPIRATORY (INHALATION)
COMMUNITY

## 2023-02-08 RX ORDER — MELATONIN
1000 DAILY
COMMUNITY

## 2023-02-08 RX ORDER — ATORVASTATIN CALCIUM 40 MG/1
40 TABLET, FILM COATED ORAL EVERY EVENING
Status: DISCONTINUED | OUTPATIENT
Start: 2023-02-08 | End: 2023-02-09 | Stop reason: HOSPADM

## 2023-02-08 RX ORDER — MECLIZINE HCL 12.5 MG/1
12.5 TABLET ORAL EVERY 8 HOURS SCHEDULED
Status: DISCONTINUED | OUTPATIENT
Start: 2023-02-08 | End: 2023-02-09 | Stop reason: HOSPADM

## 2023-02-08 RX ORDER — MECLIZINE HYDROCHLORIDE 25 MG/1
25 TABLET ORAL ONCE
Status: COMPLETED | OUTPATIENT
Start: 2023-02-08 | End: 2023-02-08

## 2023-02-08 RX ORDER — ALBUTEROL SULFATE 90 UG/1
2 AEROSOL, METERED RESPIRATORY (INHALATION) EVERY 4 HOURS PRN
Status: DISCONTINUED | OUTPATIENT
Start: 2023-02-08 | End: 2023-02-09 | Stop reason: HOSPADM

## 2023-02-08 RX ORDER — CHOLECALCIFEROL (VITAMIN D3) 125 MCG
CAPSULE ORAL
COMMUNITY

## 2023-02-08 RX ORDER — LANOLIN ALCOHOL/MO/W.PET/CERES
3 CREAM (GRAM) TOPICAL
Status: DISCONTINUED | OUTPATIENT
Start: 2023-02-08 | End: 2023-02-09 | Stop reason: HOSPADM

## 2023-02-08 RX ADMIN — ATORVASTATIN CALCIUM 40 MG: 40 TABLET, FILM COATED ORAL at 17:47

## 2023-02-08 RX ADMIN — MECLIZINE 12.5 MG: 12.5 TABLET ORAL at 21:28

## 2023-02-08 RX ADMIN — IOHEXOL 85 ML: 350 INJECTION, SOLUTION INTRAVENOUS at 08:58

## 2023-02-08 RX ADMIN — MECLIZINE HYDROCHLORIDE 25 MG: 25 TABLET ORAL at 09:38

## 2023-02-08 RX ADMIN — APIXABAN 5 MG: 5 TABLET, FILM COATED ORAL at 17:47

## 2023-02-08 RX ADMIN — INFLUENZA A VIRUS A/WISCONSIN/588/2019 (H1N1) RECOMBINANT HEMAGGLUTININ ANTIGEN, INFLUENZA A VIRUS A/DARWIN/6/2021 (H3N2) RECOMBINANT HEMAGGLUTININ ANTIGEN, INFLUENZA B VIRUS B/AUSTRIA/1359417/2021 RECOMBINANT HEMAGGLUTININ ANTIGEN, AND INFLUENZA B VIRUS B/PHUKET/3073/2013 RECOMBINANT HEMAGGLUTININ ANTIGEN 0.5 ML: 45; 45; 45; 45 INJECTION INTRAMUSCULAR at 17:45

## 2023-02-08 RX ADMIN — Medication 3 MG: at 21:29

## 2023-02-08 NOTE — CASE MANAGEMENT
Case Management Assessment & Discharge Planning Note    Patient name Vicky Duke  Location /-55 MRN [id number]  : 1965 Date 2023       Current Admission Date: 2023  Current Admission Diagnosis:Stroke-like symptoms   Patient Active Problem List    Diagnosis Date Noted   • Stroke-like symptoms 2023   • History of pulmonary embolism 2021   • Shortness of breath 2021   • Class 2 severe obesity due to excess calories with serious comorbidity in [facility]) 2021   • Occlusive thrombus 2021   • H/O bariatric surgery 2021   • Varicose veins of left lower extremity with pain 2021      LOS (days): 0  Geometric Mean LOS (GMLOS) (days):   Days to GMLOS:     OBJECTIVE:    Risk of Unplanned Readmission Score: 6 94         Current admission status: Inpatient  Referral Reason:  (dc planning, stroke protocol)    Preferred Pharmacy:   [facility] [address] [address]  Phone: [phone] Fax: [phone] [place] [address]  Phone: [phone] Fax: [phone]    Primary Care Provider: Zohaib Price DO    Primary Insurance: BLUE CROSS  Secondary Insurance:      Admit with stroke like symptoms  MRI is pending  CM met with patient, spouse and son at the bedside,baseline information  was obtained  CM discussed the role of CM in helping the patient develop a discharge plan and assist the patient in carry out their plan  Pt is  21 years, has 6 children with 3 remains in the home  ASSESSMENT:  Active Health Care Proxies    There are no active Health Care Proxies on file         Advance Directives  Does patient have a [address]?: No  Was patient offered paperwork?: Yes  Does patient currently have a Health Care decision maker?: Yes, please see Health Care Proxy section  Does patient have Advance Directives?: No  Was patient offered paperwork?: Yes  Primary Contact: Tyler Brennan         Readmission Root Cause  30 Day Readmission: No    Patient Information  Admitted from[de-identified] Home  Mental Status: Alert  During Assessment patient was accompanied by: Daughter, Spouse  Assessment information provided by[de-identified] Patient  Primary Caregiver: Self  Support Systems: Spouse/significant other, Rachel [address] of Residence: [address] do you live in?: [address] [place] [address] entry access options   Select all that apply : Stairs (12 steps in the back and 2 in front)  Type of Current Residence: [address] [place] [address]  In the last 12 months, was there a time when you were not able to pay the mortgage or rent on time?: No  In the last 12 months, how many places have you lived?: 1  In the last 12 months, was there a time when you did not have a steady place to sleep or slept in a shelter (including now)?: No  Homeless/housing insecurity resource given?: No  Living Arrangements: Lives w/ Spouse/significant other (3 children resides with her)  Is patient a ?: No    Activities of Daily Living Prior to Admission  Functional Status: Independent  Completes ADLs independently?: Yes  Ambulates independently?: Yes  Does patient use assisted devices?: No  Does patient currently own DME?: No  Does patient have a history of Outpatient Therapy (PT/OT)?: No (hx in [place] long term again)  Does the patient have a history of Short-Term Rehab?: No  Does patient have a history of HHC?: No  [facility]         Patient Information Continued  Income Source: Employed  Does patient have prescription coverage?: Yes  Within the past 12 months, you worried that your food would run out before you got the money to buy more : Never true  Within the past 12 months, the food you bought just didn't last and you didn't have money to get more : Never true  Food insecurity resource given?: No  Does patient receive dialysis treatments?: No  Does patient have a history of substance abuse?: No  Does patient have a history of Mental Health Diagnosis?: No         Means of Transportation  Means of Transport to Appts[de-identified] Drives Self  In the past 12 months, has lack of transportation kept you from medical appointments or from getting medications?: No  In the past 12 months, has lack of transportation kept you from meetings, work, or from getting things needed for daily living?: No  Was application for public transport provided?: No        DISCHARGE DETAILS:    Discharge planning discussed with[de-identified] patient  Freedom of Choice: Yes                   Contacts  Patient Contacts: Christos Orellana (spouse)  Relationship to Patient[de-identified] Family  Contact Method: In Person  Reason/Outcome: Continuity of Care, Discharge [address]         Is the patient interested in [facility] at discharge?: No    DME Referral Provided  Referral made for DME?: No              Treatment Team Recommendation: Home  Discharge Destination Plan[de-identified] Home  Transport at Discharge : Family         current dc plan is home, no needs identified at this time

## 2023-02-08 NOTE — PLAN OF CARE
Problem: Neurological Deficit  Goal: Neurological status is stable or improving  Description: Interventions:  - Monitor and assess patient's level of consciousness, motor function, sensory function, and level of assistance needed for ADLs  - Monitor and report changes from baseline  Collaborate with interdisciplinary team to initiate plan and implement interventions as ordered  - Provide and maintain a safe environment  - Consider seizure precautions  - Consider fall precautions  - Consider aspiration precautions  - Consider bleeding precautions  Outcome: Progressing     Problem: Communication Impairment  Goal: Ability to express needs and understand communication  Description: Assess patient's communication skills and ability to understand information  Patient will demonstrate use of effective communication techniques, alternative methods of communication and understanding even if not able to speak  - Encourage communication and provide alternate methods of communication as needed  - Collaborate with case management/ for discharge needs  - Include patient/family/caregiver in decisions related to communication  Outcome: Progressing     Problem: Activity Intolerance/Impaired Mobility  Goal: Mobility/activity is maintained at optimum level for patient  Description: Interventions:  - Assess and monitor patient  barriers to mobility and need for assistive/adaptive devices  - Assess patient's emotional response to limitations  - Collaborate with interdisciplinary team and initiate plans and interventions as ordered  - Encourage independent activity per ability   - Maintain proper body alignment  - Perform active/passive rom as tolerated/ordered    - Plan activities to conserve energy   - Turn patient as appropriate  Outcome: Progressing     Problem: Potential for Aspiration  Goal: Non-ventilated patient's risk of aspiration is minimized  Description: Assess and monitor vital signs, respiratory status, and labs (WBC)  Monitor for signs of aspiration (tachypnea, cough, rales, wheezing, cyanosis, fever)  - Assess and monitor patient's ability to swallow  - Place patient up in chair to eat if possible  - HOB up at 90 degrees to eat if unable to get patient up into chair   - Supervise patient during oral intake  - Instruct patient/ family to take small bites  - Instruct patient/ family to take small single sips when taking liquids  - Follow patient-specific strategies generated by speech pathologist   Outcome: Progressing     Problem: Nutrition  Goal: Nutrition/Hydration status is improving  Description: Monitor and assess patient's nutrition/hydration status for malnutrition (ex- brittle hair, bruises, dry skin, pale skin and conjunctiva, muscle wasting, smooth red tongue, and disorientation)  Collaborate with interdisciplinary team and initiate plan and interventions as ordered  Monitor patient's weight and dietary intake as ordered or per policy  Utilize nutrition screening tool and intervene per policy  Determine patient's food preferences and provide high-protein, high-caloric foods as appropriate  - Assist patient with eating   - Allow adequate time for meals   - Encourage patient to take dietary supplement as ordered  - Collaborate with clinical nutritionist   - Include patient/family/caregiver in decisions related to nutrition    Outcome: Progressing

## 2023-02-08 NOTE — ASSESSMENT & PLAN NOTE
· Vertigo and dysarthria  Which has gotten better stroke alert was called CT of the brain was negative CTA revealed- Focal outpouching at the expected location of the left P-comm origin measuring 3 mm in length and 1 5 mm at its base  Although this could represent an infundibulum of an incompletely visualized posterior communicating artery, aneurysm is not excluded  Recommend [facility] with [facility] a [facility] of [address] [place] [address] for Neuroscience at [phone]  Discussed above with neurologyits small NTD at this time needs to be followed outpatient   · Just continue eliquis   · Start statin  · Protocol MRI of the brain 2D echo with bubble    Hemoglobin A1c and lipid panel permissive hypertension  · PT OT  · Placed on meclizine  · COVID-negative we will check a UA with microscopy

## 2023-02-08 NOTE — H&P
[address]  H&P- Angel Mariscal 1965, 65 y o  female MRN: [id number]  Unit/Bed#: -01 Encounter: [id number]  Primary Care Provider: Renetta Figueredo DO   Date and time admitted to hospital: 2/8/2023  8:40 AM    * Stroke-like symptoms  Assessment & Plan  · Vertigo and dysarthria  Which has gotten better stroke alert was called CT of the brain was negative CTA revealed- Focal outpouching at the expected location of the left P-comm origin measuring 3 mm in length and 1 5 mm at its base  Although this could represent an infundibulum of an incompletely visualized posterior communicating artery, aneurysm is not excluded  Recommend consultation with the [facility], a division of [facility] at [phone]  Discussed above with neurologyits small NTD at this time needs to be followed outpatient   · Just continue eliquis   · Start statin  · Protocol MRI of the brain 2D echo with bubble  Hemoglobin A1c and lipid panel permissive hypertension  · PT OT  · Placed on meclizine  · COVID-negative we will check a UA with microscopy    History of pulmonary embolism  Assessment & Plan  · And DVTs continue Eliquis 5 mg p o  twice daily      VTE Pharmacologic Prophylaxis: VTE Score: 1 Low Risk (Score 0-2) - Encourage Ambulation  Code Status: Level 1 - Full Code   Discussion with family: Updated  ( and son) at bedside  Anticipated Length of Stay: Patient will be admitted on an inpatient basis with an anticipated length of stay of greater than 2 midnights secondary to Strokelike symptoms  Total Time for Visit, including Counseling / Coordination of Care: 60 minutes Greater than 50% of this total time spent on direct patient counseling and coordination of care      Chief Complaint: Dizziness    History of Present Illness:  Angel Mariscal is a 65 y o  female with a PMH of pulmonary embolism and DVT recurrent who presents with 6 AM waking up with dizziness described as poor balance and things spinning and then developed dysarthria currently improving  Now her imbalance is improved while in the hospital she denies any blurry or double vision she had a cold recently she had associated nausea today with some dry cough no dysuria no diarrhea  She states that she did miss Eliquis a couple of doses but not consecutively  Review of Systems:  Review of Systems   Constitutional: Negative for chills and fever  HENT: Negative for ear pain and sore throat  Eyes: Negative for pain and visual disturbance  Respiratory: Negative for cough and shortness of breath  Cardiovascular: Negative for chest pain and palpitations  Gastrointestinal: Negative for abdominal pain and vomiting  Genitourinary: Negative for dysuria and hematuria  Musculoskeletal: Negative for arthralgias and back pain  Skin: Negative for color change and rash  Neurological: Positive for dizziness and speech difficulty  Negative for seizures and syncope  All other systems reviewed and are negative  Past Medical and Surgical History:   Past Medical History:   Diagnosis Date   • DVT (deep vein thrombosis) in pregnancy        Past Surgical History:   Procedure Laterality Date   • ABDOMINAL SURGERY      Gastric Sleeve    •  SECTION         Meds/Allergies:  Prior to Admission medications    Medication Sig Start Date End Date Taking?  Authorizing Provider   albuterol (PROVENTIL HFA,VENTOLIN HFA) 90 mcg/act inhaler Inhale 2 puffs   Yes Historical Provider, MD   apixaban (ELIQUIS) 5 mg Take 1 tablet (5 mg total) by mouth 2 (two) times a day 3/4/21 2/8/23 Yes Keny Lott MD   fexofenadine (ALLEGRA) 180 MG tablet Take 180 mg by mouth if needed   Yes Historical Provider, MD   Melatonin 5 MG TABS Take by mouth daily at bedtime   Yes Historical Provider, MD   Calcium Ascorbate (VITAMIN C) 500 mg tablet Take 500 mg by mouth daily  Patient not taking: Reported on 2023    Historical Provider, MD   cholecalciferol (VITAMIN D3) 1,000 units tablet Take 1,000 Units by mouth daily    Historical Provider, MD   ergocalciferol (VITAMIN D2) 50,000 units TAKE 2 CAPSULES BY MOUTH ONCE A WEEK 21  Historical Provider, MD   Multiple Vitamins-Minerals (Multivitamin Adult Extra C) CHEW Chew 2 each  23  Historical Provider, MD     I have reviewed home medications with a medical source (PCP, Pharmacy, other)  Allergies: Allergies   Allergen Reactions   • Medical Tape Rash       Social History:  Marital Status: /Civil Union   Substance Use History:   Social History     Substance and Sexual Activity   Alcohol Use Never     Social History     Tobacco Use   Smoking Status Former   • Types: Cigarettes   • Quit date:    • Years since quittin 1   Smokeless Tobacco Never     Social History     Substance and Sexual Activity   Drug Use Never       Family History:  History reviewed  No pertinent family history  Physical Exam:     Vitals:   Blood Pressure: 141/89 (23 1143)  Pulse: 73 (23 1143)  Temperature: 97 7 °F (36 5 °C) (23 1143)  Temp Source: Temporal (23 1143)  Respirations: 16 (23 1143)  Weight - Scale: 120 kg (264 lb 8 8 oz) (23 0836)  SpO2: 99 % (23 1143)    Physical Exam  Vitals and nursing note reviewed  Constitutional:       General: She is not in acute distress  Appearance: She is well-developed  HENT:      Head: Normocephalic and atraumatic  Eyes:      Extraocular Movements:      Right eye: Nystagmus present  Left eye: Nystagmus present  Conjunctiva/sclera: Conjunctivae normal    Cardiovascular:      Rate and Rhythm: Normal rate and regular rhythm  Heart sounds: No murmur heard  Pulmonary:      Effort: Pulmonary effort is normal  No respiratory distress  Breath sounds: Normal breath sounds  No wheezing or rales  Abdominal:      Palpations: Abdomen is soft        Tenderness: There is no abdominal tenderness  Musculoskeletal:         General: No swelling  Cervical back: Neck supple  Skin:     General: Skin is warm and dry  Capillary Refill: Capillary refill takes less than 2 seconds  Neurological:      Mental Status: She is alert and oriented to person, place, and time  Psychiatric:         Mood and Affect: Mood normal           Additional Data:     Lab Results:  Results from last 7 days   Lab Units 02/08/23  0856   WBC Thousand/uL 8 01   HEMOGLOBIN g/dL 13 7   HEMATOCRIT % 41 3   PLATELETS Thousands/uL 321     Results from last 7 days   Lab Units 02/08/23  0856   SODIUM mmol/L 139   POTASSIUM mmol/L 4 0   CHLORIDE mmol/L 104   CO2 mmol/L 30   BUN mg/dL 13   CREATININE mg/dL 0 74   ANION GAP mmol/L 5   CALCIUM mg/dL 9 3   ALBUMIN g/dL 4 1   TOTAL BILIRUBIN mg/dL 0 96   ALK PHOS U/L 59   ALT U/L 13   AST U/L 11*   GLUCOSE RANDOM mg/dL 199*     Results from last 7 days   Lab Units 02/08/23  0856   INR  1 01                   Lines/Drains:  Invasive Devices     Peripheral Intravenous Line  Duration           Peripheral IV 02/08/23 Left Antecubital <1 day                    Imaging: Reviewed radiology reports from this admission including: CT head  CT stroke alert brain   Final Result by Gil Hernandez DO (02/08 0919)      No acute intracranial abnormality  Findings were directly communicated with Kulwinder Clements at approximately 9:15 AM via confirmed Tiger text  Workstation performed: [id number]         CTA stroke alert (head/neck)   Final Result by Gil Hernandez DO (02/08 1072)      No stenosis, occlusion or thrombosis of the cervical or intracranial vasculature  Focal outpouching at the expected location of the left P-comm origin measuring 3 mm in length and 1 5 mm at its base  Although this could represent an infundibulum of an incompletely visualized posterior communicating artery, aneurysm is not excluded      Recommend consultation with the [facility], a division of [address] [place] [address] Neuroscience at [phone]  Incidental thyroid nodule(s) for which nonemergent thyroid ultrasound is recommended  Findings were directly communicated with Pierre Avendaño at approximately 9:15 AM via confirmed Tiger text  Workstation performed: [id number]         MRI Inpatient Order    (Results Pending)       EKG and Other Studies Reviewed on Admission:   · EKG: NSR  HR 70     ** Please Note: This note has been constructed using a voice recognition system   **

## 2023-02-08 NOTE — Clinical Note
Ariana Tenorio was seen and treated in our emergency department on 2/8/2023  No restrictions            Diagnosis:     Linden Gil  may return to work on return date  She may return on this date: 02/09/2023    Was a pt 2/7-2/8, may return to work on 2/9/23 with no restriction  If you have any questions or concerns, please don't hesitate to call        Parmjit Meehan RN    ______________________________           _______________          _______________  Hospital Representative                              Date                                Time

## 2023-02-08 NOTE — Clinical Note
Case was discussed with Dr Arik Watson and the patient's admission status was agreed to be  to the service of Dr Arik Watson

## 2023-02-08 NOTE — TELEMEDICINE
TeleConsultation - Neurology   Bita Castañeda 65 y o  female MRN: [id number]   Encounter: [id number]      REQUIRED DOCUMENTATION:     1  This service was provided via Telemedicine  2  Provider located in [place]   3  TeleMed provider: Alondra Moss MD   4  Identify all parties in room with patient during tele consult:  RN  5  After connecting through [employer], patient was identified by name and date of birth and assistant checked wristband  Patient was then informed that this was a Telemedicine visit and that the exam was being conducted confidentially over secure lines  My office door was closed  No one else was in the room  Patient acknowledged consent and understanding of privacy and security of the Telemedicine visit, and gave us permission to have the assistant stay in the room in order to assist with the history and to conduct the exam   I informed the patient that I have reviewed their record in Epic and presented the opportunity for them to ask any questions regarding the visit today  The patient agreed to participate  Assessment/Plan   Assessment:  Dizziness:  Likely peripheral, considering the strong positional component when it starts and that it improved quickly  Hypertensive urgency is also possible, BP was 027 systolic upon presentation  However, excluding stroke is reasonable  We will wait for MRI brain, if negative, then no further work-up  Of note, CTA showed small p-comm aneurysm that can be followed by repeating CTA in a year  Patient is on Eliquis for PE     TNK not given: low NIHSS at time of presentation      History of Present Illness     Reason for Consult / Principal Problem: dizziness  Hx and PE limited by: none  Last seen normal: 10 pm the night before  NIHSS: 0 as per ED MD    HPI: Bita Castañeda is a 65 y o  female who presents with dizziness upon standing up from sleeping this morning  She felt off balance and had to stabilize herself  She also felt nauseous   This has never happened before  She presented and and then symptoms started gradually improving  Son believed she was slurring her speech this morning  Inpatient consult to Neurology  Consult performed by: Amanda Herring MD  Consult ordered by: Leelee Loco MD          Review of Systems  Complete ROS was done and is negative other than what is mentioned in HPI    Historical Information   Past Medical History:   Diagnosis Date   • DVT (deep vein thrombosis) in pregnancy      Past Surgical History:   Procedure Laterality Date   • ABDOMINAL SURGERY      Gastric Sleeve    •  SECTION       Social History   Social History     Substance and Sexual Activity   Alcohol Use Never     Social History     Substance and Sexual Activity   Drug Use Never     Social History     Tobacco Use   Smoking Status Former   • Types: Cigarettes   • Quit date:    • Years since quittin 1   Smokeless Tobacco Never     Family History: non-contributory    Review of previous medical records was completed  Meds/Allergies   current meds:   Current Facility-Administered Medications   Medication Dose Route Frequency   • albuterol (PROVENTIL HFA,VENTOLIN HFA) inhaler 2 puff  2 puff Inhalation Q4H PRN   • apixaban (ELIQUIS) tablet 5 mg  5 mg Oral BID   • atorvastatin (LIPITOR) tablet 40 mg  40 mg Oral QPM   • meclizine (ANTIVERT) tablet 12 5 mg  12 5 mg Oral Q8H [facility]   • melatonin tablet 3 mg  3 mg Oral HS       Allergies   Allergen Reactions   • Medical Tape Rash       Objective   Vitals:Blood pressure (!) 177/93, pulse 96, temperature 97 9 °F (36 6 °C), resp  rate 16, weight 120 kg (264 lb 8 8 oz), SpO2 100 %  ,Body mass index is 41 43 kg/m²  No intake or output data in the 24 hours ending 23 1851    Invasive Devices:    Invasive Devices     Peripheral Intravenous Line  Duration           Peripheral IV 23 Left Antecubital <1 day                Physical Exam NAD  Skin: no seen lesions  HEENT: ATNC  Chest: breathing comfortably on room air  GI: no apparent distension  Neurologic Exam Alert and oriented for self, place and time  Memory is intact to recent and remote events  Language is intact to comprehension and expression  No neglect  Speech is normal  EOMI  Pupils are symmetric  Visual field appears intact  Face is symmetric  Tongue is midline  Able to move all extremities against gravity  No dysmetria noted  Lab Results:   CBC:   Results from last 7 days   Lab Units 02/08/23  0856   WBC Thousand/uL 8 01   RBC Million/uL 4 56   HEMOGLOBIN g/dL 13 7   HEMATOCRIT % 41 3   MCV fL 91   PLATELETS Thousands/uL 321   , BMP/CMP:   Results from last 7 days   Lab Units 02/08/23  0856   SODIUM mmol/L 139   POTASSIUM mmol/L 4 0   CHLORIDE mmol/L 104   CO2 mmol/L 30   BUN mg/dL 13   CREATININE mg/dL 0 74   CALCIUM mg/dL 9 3   AST U/L 11*   ALT U/L 13   ALK PHOS U/L 59   EGFR ml/min/1 73sq m 90     Imaging Studies: I have personally reviewed pertinent films in PACS with a Radiologist   EKG, Pathology, and Other Studies: I have personally reviewed pertinent reports      VTE Prophylaxis: Sequential compression device Aurelia Zurita)     Code Status: Level 1 - Full Code  Advance Directive and Living Will:      Power of :    POLST:      Counseling / Coordination of Care  N/A

## 2023-02-08 NOTE — ED PROVIDER NOTES
History  Chief Complaint   Patient presents with   • Dizziness     Dizziness since 0600 this am  States she has to hold on to furniture to prevent herself from falling  Patient is currently on Eliquis secondary to PEs  Patient states that she went to bed at 10 PM last night feeling fine  Woke up at 6 AM and felt dizzy  Felt lightheaded  Felt off balance  Felt like the room was moving  Questionably worse with movement  Laying down did not seem to improve her symptoms  Has had a slight headache  Family noticed that she seemed slightly confused and speech was slurred earlier today  Speech now back to normal   Patient states she just does not feel right  History provided by:  Patient   used: No    Dizziness  Quality:  Lightheadedness, imbalance and room spinning  Severity:  Mild  Onset quality:  Unable to specify  Timing:  Constant  Progression:  Unchanged  Chronicity:  New  Context: not with ear pain and not with inactivity    Relieved by:  Nothing  Worsened by:  Nothing  Ineffective treatments:  None tried  Associated symptoms: headaches    Associated symptoms: no blood in stool, no chest pain, no diarrhea, no hearing loss, no nausea, no palpitations, no shortness of breath, no syncope, no vision changes and no vomiting        Prior to Admission Medications   Prescriptions Last Dose Informant Patient Reported? Taking?    Multiple Vitamins-Minerals (Multivitamin Adult Extra C) CHEW   Yes No   Sig: Chew 2 each   apixaban (ELIQUIS) 5 mg   No No   Sig: Take 1 tablet (5 mg total) by mouth 2 (two) times a day   ergocalciferol (VITAMIN D2) 50,000 units   Yes No   Sig: TAKE 2 CAPSULES BY MOUTH ONCE A WEEK   fexofenadine (ALLEGRA) 180 MG tablet   Yes No   Sig: Take 180 mg by mouth      Facility-Administered Medications: None       Past Medical History:   Diagnosis Date   • DVT (deep vein thrombosis) in pregnancy        Past Surgical History:   Procedure Laterality Date   • ABDOMINAL SURGERY      Gastric Sleeve    •  SECTION         History reviewed  No pertinent family history  I have reviewed and agree with the history as documented  E-Cigarette/Vaping   • E-Cigarette Use Never User      E-Cigarette/Vaping Substances     Social History     Tobacco Use   • Smoking status: Former     Types: Cigarettes     Quit date:      Years since quittin    • Smokeless tobacco: Never   Vaping Use   • Vaping Use: Never used   Substance Use Topics   • Alcohol use: Never   • Drug use: Never       Review of Systems   Constitutional: Negative for chills and fever  HENT: Negative for ear pain, hearing loss, sore throat, trouble swallowing and voice change  Eyes: Negative for pain and discharge  Respiratory: Negative for cough, shortness of breath and wheezing  Cardiovascular: Negative for chest pain, palpitations and syncope  Gastrointestinal: Negative for abdominal pain, blood in stool, constipation, diarrhea, nausea and vomiting  Genitourinary: Negative for dysuria, flank pain, frequency and hematuria  Musculoskeletal: Negative for joint swelling, neck pain and neck stiffness  Skin: Negative for rash and wound  Neurological: Positive for dizziness, speech difficulty and headaches  Negative for seizures, syncope and facial asymmetry  Psychiatric/Behavioral: Negative for hallucinations, self-injury and suicidal ideas  All other systems reviewed and are negative  Physical Exam  Physical Exam  Vitals and nursing note reviewed  Constitutional:       General: She is not in acute distress  Appearance: She is well-developed  HENT:      Head: Normocephalic and atraumatic  Right Ear: External ear normal       Left Ear: External ear normal    Eyes:      General: No scleral icterus  Right eye: No discharge  Left eye: No discharge  Extraocular Movements: Extraocular movements intact        Conjunctiva/sclera: Conjunctivae normal  Cardiovascular:      Rate and Rhythm: Normal rate and regular rhythm  Heart sounds: Normal heart sounds  No murmur heard  Pulmonary:      Effort: Pulmonary effort is normal       Breath sounds: Normal breath sounds  No wheezing or rales  Abdominal:      General: Bowel sounds are normal  There is no distension  Palpations: Abdomen is soft  Tenderness: There is no abdominal tenderness  There is no guarding or rebound  Musculoskeletal:         General: No deformity  Normal range of motion  Cervical back: Normal range of motion and neck supple  Skin:     General: Skin is warm and dry  Findings: No rash  Neurological:      General: No focal deficit present  Mental Status: She is alert and oriented to person, place, and time  Cranial Nerves: No cranial nerve deficit  Psychiatric:         Mood and Affect: Mood normal          Behavior: Behavior normal          Thought Content:  Thought content normal          Judgment: Judgment normal          Vital Signs  ED Triage Vitals   Temperature Pulse Respirations Blood Pressure SpO2   02/08/23 0836 02/08/23 0836 02/08/23 0836 02/08/23 0838 02/08/23 0836   97 5 °F (36 4 °C) 81 18 (!) 213/101 98 %      Temp Source Heart Rate Source Patient Position - Orthostatic VS BP Location FiO2 (%)   02/08/23 0836 02/08/23 0836 02/08/23 0836 02/08/23 0836 --   Temporal Monitor Sitting Left arm       Pain Score       02/08/23 0836       No Pain           Vitals:    02/08/23 0838 02/08/23 0850 02/08/23 0920 02/08/23 0923   BP: (!) 213/101 (!) 182/101 160/76 160/76   Pulse:  82 71    Patient Position - Orthostatic VS:  Lying           Visual Acuity  Visual Acuity    Flowsheet Row Most Recent Value   L Pupil Size (mm) 3   R Pupil Size (mm) 3          ED Medications  Medications   meclizine (ANTIVERT) tablet 25 mg (has no administration in time range)   iohexol (OMNIPAQUE) 350 MG/ML injection (SINGLE-DOSE) 85 mL (85 mL Intravenous Given 2/8/23 0858) Diagnostic Studies  Results Reviewed     Procedure Component Value Units Date/Time    HS Troponin 0hr (reflex protocol) [[id number]]  (Normal) Collected: 02/08/23 0856    Lab Status: Final result Specimen: Blood from Arm, Left Updated: 02/08/23 0931     hs TnI 0hr 3 ng/L     HS Troponin I 2hr [[id number]]     Lab Status: No result Specimen: Blood     Basic metabolic panel [[id number]]  (Abnormal) Collected: 02/08/23 0856    Lab Status: Final result Specimen: Blood from Arm, Left Updated: 02/08/23 0926     Sodium 139 mmol/L      Potassium 4 0 mmol/L      Chloride 104 mmol/L      CO2 30 mmol/L      ANION GAP 5 mmol/L      BUN 13 mg/dL      Creatinine 0 74 mg/dL      Glucose 199 mg/dL      Calcium 9 3 mg/dL      eGFR 90 ml/min/1 73sq m     Narrative:      [place] guidelines for Chronic Kidney Disease (CKD):   •  Stage 1 with normal or high GFR (GFR > 90 mL/min/1 73 square meters)  •  Stage 2 Mild CKD (GFR = 60-89 mL/min/1 73 square meters)  •  Stage 3A Moderate CKD (GFR = 45-59 mL/min/1 73 square meters)  •  Stage 3B Moderate CKD (GFR = 30-44 mL/min/1 73 square meters)  •  Stage 4 Severe CKD (GFR = 15-29 mL/min/1 73 square meters)  •  Stage 5 End Stage CKD (GFR <15 mL/min/1 73 square meters)  Note: GFR calculation is accurate only with a steady state creatinine    Hepatic function panel [[id number]]  (Abnormal) Collected: 02/08/23 0856    Lab Status: Final result Specimen: Blood from Arm, Left Updated: 02/08/23 0926     Total Bilirubin 0 96 mg/dL      Bilirubin, Direct 0 10 mg/dL      Alkaline Phosphatase 59 U/L      AST 11 U/L      ALT 13 U/L      Total Protein 7 0 g/dL      Albumin 4 1 g/dL     FLU/RSV/COVID - if FLU/RSV clinically relevant [[id number]] Collected: 02/08/23 0856    Lab Status:  In process Specimen: Nares from Nasopharyngeal Swab Updated: 02/08/23 0924    Protime-INR [[id number]]  (Normal) Collected: 02/08/23 0856    Lab Status: Final result Specimen: Blood from Arm, Left Updated: 02/08/23 0919     Protime 13 5 seconds      INR 1 01    APTT [[id number]]  (Normal) Collected: 02/08/23 0856    Lab Status: Final result Specimen: Blood from Arm, Left Updated: 02/08/23 0919     PTT 25 seconds     CBC and Platelet [[id number]]  (Normal) Collected: 02/08/23 0856    Lab Status: Final result Specimen: Blood from Arm, Left Updated: 02/08/23 0904     WBC 8 01 Thousand/uL      RBC 4 56 Million/uL      Hemoglobin 13 7 g/dL      Hematocrit 41 3 %      MCV 91 fL      MCH 30 0 pg      MCHC 33 2 g/dL      RDW 13 3 %      Platelets 062 Thousands/uL      MPV 10 0 fL                  CT stroke alert brain   Final Result by Gil Whittaker DO (02/08 0919)      No acute intracranial abnormality  Findings were directly communicated with Lela Jackman at approximately 9:15 AM via confirmed Tiger text  Workstation performed: [id number]         CTA stroke alert (head/neck)   Final Result by Gil Whittaker DO (02/08 4361)      No stenosis, occlusion or thrombosis of the cervical or intracranial vasculature  Focal outpouching at the expected location of the left P-comm origin measuring 3 mm in length and 1 5 mm at its base  Although this could represent an infundibulum of an incompletely visualized posterior communicating artery, aneurysm is not excluded  Recommend consultation with [facility], a division of [address] [place] [address] Neuroscience at [phone]  Incidental thyroid nodule(s) for which nonemergent thyroid ultrasound is recommended  Findings were directly communicated with Lela Jackman at approximately 9:15 AM via confirmed Tiger text                             Workstation performed: [id number]                    Procedures  ECG 12 Lead Documentation Only    Date/Time: 2/8/2023 8:52 AM  Performed by: Bernardo Hernandez MD  Authorized by: Bernardo Hernandez MD     ECG reviewed by me, the ED Provider: yes    Patient location:  ED  Rate: ECG rate:  70  Rhythm:     Rhythm: sinus rhythm    Ectopy:     Ectopy: none    QRS:     QRS axis:  Normal             ED Course  ED Course as of 02/08/23 0936   Wed Feb 08, 2023   0845 Stroke alert called at [facility] Discussed with neurology on-call, Dr Georges Salinas  Agrees with stroke alert  Repeat blood pressure  Will need admission for stroke pathway  0908 Stroke alert not immediately called as the patient only complained of dizziness  Son later started saying that the patient was slurred and seemed to be confused  7523 Blood pressure now 160/76 without any interventions  We will hold off on any antihypertensive medications  Stroke Assessment     Row Name 02/08/23 2885             NIH Stroke Scale    Interval Baseline      Level of Consciousness (1a ) 0      LOC Questions (1b ) 0      LOC Commands (1c ) 0      Best Gaze (2 ) 0      Visual (3 ) 0      Facial Palsy (4 ) 0      Motor Arm, Left (5a ) 0      Motor Arm, Right (5b ) 0      Motor Leg, Left (6a ) 0      Motor Leg, Right (6b ) 0      Limb Ataxia (7 ) 0      Sensory (8 ) 0      Best Language (9 ) 0      Dysarthria (10 ) 0      Extinction and Inattention (11 ) (Formerly Neglect) 0      Total 0              Flowsheet Row Most Recent Value   Thrombolytic Decision Options    Thrombolytic Decision Patient not a candidate  Patient is not a candidate options Unclear time of onset outside appropriate time window , Bleeding risk  Medical Decision Making  Dizziness: complicated acute illness or injury  Amount and/or Complexity of Data Reviewed  Independent Historian: caregiver  External Data Reviewed: labs, radiology, ECG and notes  Labs: ordered  Decision-making details documented in ED Course  Radiology: ordered and independent interpretation performed  Decision-making details documented in ED Course  ECG/medicine tests: ordered and independent interpretation performed   Decision-making details documented in ED Course  Risk  OTC drugs  Prescription drug management  Decision regarding hospitalization  Disposition  Final diagnoses:   Dizziness   Hypertension, unspecified type   Slurred speech     Time reflects when diagnosis was documented in both MDM as applicable and the Disposition within this note     Time User Action Codes Description Comment    2/8/2023  8:47 AM Candice Sy Add [R42] Dizziness     2/8/2023  9:17 AM Candice Sy Add [I10] Hypertension, unspecified type     2/8/2023  9:28 AM Candice Sy Add [R47 81] Slurred speech       ED Disposition     ED Disposition   Admit    Condition   Stable    Date/Time   Wed Feb 8, 2023  9:36 AM    Comment   Case was discussed with Dr Aquiles Duron and the patient's admission status was agreed to be  to the service of Dr Lissy Whitley  Follow-up Information    None         Patient's Medications   Discharge Prescriptions    No medications on file       No discharge procedures on file      PDMP Review       Value Time User    PDMP Reviewed  Yes 3/3/2021 12:32 PM Cory Stewart MD          ED Provider  Electronically Signed by           Markie Batista MD  02/08/23 3997

## 2023-02-09 ENCOUNTER — APPOINTMENT (INPATIENT)
Dept: NON INVASIVE DIAGNOSTICS | Facility: HOSPITAL | Age: 58
End: 2023-02-09

## 2023-02-09 VITALS
SYSTOLIC BLOOD PRESSURE: 129 MMHG | HEART RATE: 91 BPM | DIASTOLIC BLOOD PRESSURE: 81 MMHG | BODY MASS INDEX: 41.44 KG/M2 | TEMPERATURE: 97.5 F | OXYGEN SATURATION: 96 % | WEIGHT: 264 LBS | RESPIRATION RATE: 19 BRPM | HEIGHT: 67 IN

## 2023-02-09 PROBLEM — E04.1 THYROID NODULE: Status: ACTIVE | Noted: 2023-02-09

## 2023-02-09 PROBLEM — R42 VERTIGO: Status: ACTIVE | Noted: 2023-02-08

## 2023-02-09 PROBLEM — I16.0 HYPERTENSIVE URGENCY: Status: ACTIVE | Noted: 2023-02-09

## 2023-02-09 PROBLEM — E11.9 TYPE 2 DIABETES MELLITUS, WITHOUT LONG-TERM CURRENT USE OF INSULIN (HCC): Status: ACTIVE | Noted: 2023-02-09

## 2023-02-09 LAB
AORTIC ROOT: 2.9 CM
APICAL FOUR CHAMBER EJECTION FRACTION: 60 %
ATRIAL RATE: 70 BPM
BACTERIA UR QL AUTO: ABNORMAL /HPF
BILIRUB UR QL STRIP: ABNORMAL
CHOLEST SERPL-MCNC: 170 MG/DL
CLARITY UR: CLEAR
COLOR UR: YELLOW
E WAVE DECELERATION TIME: 174 MS
EST. AVERAGE GLUCOSE BLD GHB EST-MCNC: 183 MG/DL
FRACTIONAL SHORTENING: 33 (ref 28–44)
GLUCOSE UR STRIP-MCNC: ABNORMAL MG/DL
HBA1C MFR BLD: 8 %
HDLC SERPL-MCNC: 38 MG/DL
HGB UR QL STRIP.AUTO: NEGATIVE
INTERVENTRICULAR SEPTUM IN DIASTOLE (PARASTERNAL SHORT AXIS VIEW): 0.7 CM
INTERVENTRICULAR SEPTUM: 0.7 CM (ref 0.6–1.1)
KETONES UR STRIP-MCNC: NEGATIVE MG/DL
LAAS-AP2: 7.9 CM2
LAAS-AP4: 13 CM2
LDLC SERPL CALC-MCNC: 96 MG/DL (ref 0–100)
LEFT ATRIUM SIZE: 3.8 CM
LEFT INTERNAL DIMENSION IN SYSTOLE: 3.3 CM (ref 2.1–4)
LEFT VENTRICLE DIASTOLIC VOLUME (MOD BIPLANE): 64 ML
LEFT VENTRICLE SYSTOLIC VOLUME (MOD BIPLANE): 26 ML
LEFT VENTRICULAR INTERNAL DIMENSION IN DIASTOLE: 4.9 CM (ref 3.5–6)
LEFT VENTRICULAR POSTERIOR WALL IN END DIASTOLE: 1.1 CM
LEFT VENTRICULAR STROKE VOLUME: 69 ML
LEUKOCYTE ESTERASE UR QL STRIP: NEGATIVE
LV EF: 60 %
LVSV (TEICH): 69 ML
MV E'TISSUE VEL-LAT: 10 CM/S
MV E'TISSUE VEL-SEP: 10 CM/S
MV PEAK A VEL: 0.73 M/S
MV PEAK E VEL: 82 CM/S
MV STENOSIS PRESSURE HALF TIME: 51 MS
MV VALVE AREA P 1/2 METHOD: 4.31
NITRITE UR QL STRIP: NEGATIVE
NON-SQ EPI CELLS URNS QL MICRO: ABNORMAL /HPF
P AXIS: 41 DEGREES
PH UR STRIP.AUTO: 6 [PH]
PR INTERVAL: 142 MS
PROT UR STRIP-MCNC: NEGATIVE MG/DL
QRS AXIS: 22 DEGREES
QRSD INTERVAL: 92 MS
QT INTERVAL: 394 MS
QTC INTERVAL: 425 MS
RBC #/AREA URNS AUTO: ABNORMAL /HPF
RIGHT ATRIAL 2D VOLUME: 26 ML
RIGHT ATRIUM AREA SYSTOLE A4C: 12.4 CM2
RIGHT VENTRICLE ID DIMENSION: 4 CM
SL CV LEFT ATRIUM LENGTH A2C: 3.6 CM
SL CV PED ECHO LEFT VENTRICLE DIASTOLIC VOLUME (MOD BIPLANE) 2D: 112 ML
SL CV PED ECHO LEFT VENTRICLE SYSTOLIC VOLUME (MOD BIPLANE) 2D: 43 ML
SP GR UR STRIP.AUTO: >=1.03 (ref 1–1.03)
T WAVE AXIS: 42 DEGREES
TRIGL SERPL-MCNC: 179 MG/DL
UROBILINOGEN UR QL STRIP.AUTO: 0.2 E.U./DL
VENTRICULAR RATE: 70 BPM
WBC #/AREA URNS AUTO: ABNORMAL /HPF

## 2023-02-09 RX ORDER — LISINOPRIL 10 MG/1
10 TABLET ORAL DAILY
Qty: 30 TABLET | Refills: 0 | Status: SHIPPED | OUTPATIENT
Start: 2023-02-10

## 2023-02-09 RX ORDER — ACETAMINOPHEN 325 MG/1
650 TABLET ORAL EVERY 6 HOURS PRN
Status: DISCONTINUED | OUTPATIENT
Start: 2023-02-09 | End: 2023-02-09 | Stop reason: HOSPADM

## 2023-02-09 RX ORDER — AMLODIPINE BESYLATE 5 MG/1
5 TABLET ORAL DAILY
Status: DISCONTINUED | OUTPATIENT
Start: 2023-02-09 | End: 2023-02-09

## 2023-02-09 RX ORDER — MECLIZINE HCL 12.5 MG/1
12.5 TABLET ORAL EVERY 8 HOURS PRN
Qty: 30 TABLET | Refills: 0 | Status: SHIPPED | OUTPATIENT
Start: 2023-02-09

## 2023-02-09 RX ORDER — LISINOPRIL 10 MG/1
10 TABLET ORAL DAILY
Status: DISCONTINUED | OUTPATIENT
Start: 2023-02-09 | End: 2023-02-09 | Stop reason: HOSPADM

## 2023-02-09 RX ADMIN — APIXABAN 5 MG: 5 TABLET, FILM COATED ORAL at 08:48

## 2023-02-09 RX ADMIN — LISINOPRIL 10 MG: 10 TABLET ORAL at 10:49

## 2023-02-09 RX ADMIN — PERFLUTREN 0.4 ML/MIN: 6.52 INJECTION, SUSPENSION INTRAVENOUS at 08:05

## 2023-02-09 RX ADMIN — MECLIZINE 12.5 MG: 12.5 TABLET ORAL at 05:09

## 2023-02-09 RX ADMIN — ACETAMINOPHEN 325MG 650 MG: 325 TABLET ORAL at 07:35

## 2023-02-09 NOTE — DISCHARGE INSTR - AVS FIRST PAGE
Of note, CTA showed small p-comm aneurysm that can be followed by repeating CTA in a year  pcp should order repeat  Thyroid nodule us of thyroid has been ordered

## 2023-02-09 NOTE — PHYSICAL THERAPY NOTE
Physical Therapy Screen    Patient Name: Danitza Medeiros    QUEGX'C Date: 2/9/2023     Problem List  Principal Problem:    Stroke-like symptoms  Active Problems:    History of pulmonary embolism    Dizziness       Past Medical History  Past Medical History:   Diagnosis Date   • DVT (deep vein thrombosis) in pregnancy          02/09/23 0819   Note Type   Note type Screen   PT orders received  Chart review completed  Pt admitted to [address] [place] [address] 2/8/2023 with Dx: stroke-like symptoms  Pt reports having dizziness, slurred speech and a headache  Pt demonstrating ability to complete ambulation in room @ I with no difficulty noted  Pt reports all symptoms resolved other than "I still have a migraine"  Pt verbalizes no concerns with returning home at this time  D/c PT effective 2/9/2023  If new concerns arise, please re-consult       Abril Cope, PT, DPT

## 2023-02-09 NOTE — ASSESSMENT & PLAN NOTE
Lab Results   Component Value Date    HGBA1C 8 0 (H) 02/09/2023       No results for input(s): POCGLU in the last 72 hours  Blood Sugar Average: Last 72 hrs:  · Prior to gastric sleeve she was on metformin for which she was not able to tolerate as she had diarrhea    Now her hemoglobin A1c back in April was 7 1 currently is at 8 her average sugars 180s not keen on restarting the medication due to side effects and I tell her to for now keep a good diet control and next week follow-up with her primary care physician to discuss starting p o  regimen other than metformin for her diabetes

## 2023-02-09 NOTE — UTILIZATION REVIEW
Initial Clinical Review    Admission: Date/Time/Statement:   Admission Orders (From admission, onward)     Ordered        02/08/23 0936  INPATIENT ADMISSION  Once                      Orders Placed This Encounter   Procedures   • INPATIENT ADMISSION     Standing Status:   Standing     Number of Occurrences:   1     Order Specific Question:   Level of Care     Answer:   Med Surg [16]     Order Specific Question:   Estimated length of stay     Answer:   More than 2 Midnights     Order Specific Question:   Certification     Answer:   I certify that inpatient services are medically necessary for this patient for a duration of greater than two midnights  See H&P and MD Progress Notes for additional information about the patient's course of treatment  ED Arrival Information     Expected   -    Arrival   2/8/2023 08:27    Acuity   Urgent            Means of arrival   Walk-In    Escorted by   Family Member    Service   Hospitalist    Admission type   Emergency            Arrival complaint   Dizziness           Chief Complaint   Patient presents with   • Dizziness     Dizziness since 0600 this am  States she has to hold on to furniture to prevent herself from falling  Initial Presentation: 65 y o  female with PMH of PT and DVT presented to the ED form home after waking up at 6 am w/ dizziness described as poor balance and things spinning and then developed dysarthria  Pt reports associated w/ nausea and some dry cough  Reports recent cold  Reports missed couple doses of Eliquis  In the ED, /101  CT brain was negative  CTA revealed- Focal outpouching at the expected location of the left P-comm origin measuring 3 mm in length and 1 5 mm at its base   Although this could represent an infundibulum of an incompletely visualized posterior communicating artery, aneurysm is not excluded  On exam, aaox3  Nystagmus on R eye and L eye present  Given po meclizine  Admitted as Inpatient for stroke like symptoms  Plan:Cont Eliquis  Start statin  Protocol MRI of the brain 2D echo with bubble  Hemoglobin A1c and lipid panel, permissive hypertension  PT/OT  Meclizine  Check UA w/ microscopy  Neurology consult  02/08 Neurology Consult: Dizziness, likely peripheral, considering the strong positional component when it starts and that it improved quickly  Hypertensive urgency is also possible, BP was 808 systolic upon presentation  MRI of brain pending  TNK not given: low NIHSS at time of presentation     Date: 02/09   Day 2:   IM Notes: MRI neg  Symptoms resolved  BP improved  Cont Meclizine prn  Started on Lisinopril 10 mg daily  F/u w/ PCP       ED Triage Vitals   Temperature Pulse Respirations Blood Pressure SpO2   02/08/23 0836 02/08/23 0836 02/08/23 0836 02/08/23 0838 02/08/23 0836   97 5 °F (36 4 °C) 81 18 (!) 213/101 98 %      Temp Source Heart Rate Source Patient Position - Orthostatic VS BP Location FiO2 (%)   02/08/23 0836 02/08/23 0836 02/08/23 0836 02/08/23 0836 --   Temporal Monitor Sitting Left arm       Pain Score       02/08/23 0836       No Pain          Wt Readings from Last 1 Encounters:   02/09/23 120 kg (264 lb)     Additional Vital Signs:   Date/Time Temp Pulse Resp BP MAP (mmHg) SpO2 O2 Device Patient Position - Orthostatic VS   02/09/23 09:46:19 97 5 °F (36 4 °C) 81 18 143/73 96 94 % -- --   02/09/23 07:45:45 -- 72 18 155/77 103 96 % -- --   02/09/23 07:42:40 98 6 °F (37 °C) 71 -- 165/78 107 96 % -- --   02/09/23 0543 97 7 °F (36 5 °C) 74 17 148/89 109 96 % -- Lying   02/09/23 01:46:58 -- -- -- -- -- 94 % -- --   02/09/23 0143 98 4 °F (36 9 °C) 81 16 96/62 73 92 % None (Room air) Lying   02/08/23 2143 97 9 °F (36 6 °C) 83 20 145/86 106 95 % None (Room air) Lying   02/08/23 1943 98 8 °F (37 1 °C) 81 18 153/93 113 95 % None (Room air) Lying   02/08/23 1743 98 1 °F (36 7 °C) 96 16 177/93 Abnormal  -- -- -- --   02/08/23 1543 97 9 °F (36 6 °C) 77 16 168/90 -- -- -- --   02/08/23 1430 97 9 °F (36 6 °C) 72 16 166/93 117 100 % None (Room air) Lying   02/08/23 1343 97 8 °F (36 6 °C) 87 18 148/90 -- -- -- --   02/08/23 1243 97 6 °F (36 4 °C) 74 16 140/82 -- -- -- --   02/08/23 11:43:07 97 7 °F (36 5 °C) 73 16 141/89 106 99 % -- Lying   02/08/23 11:38:56 -- -- -- 187/105 Abnormal  132 -- -- Lying   02/08/23 1030 -- 66 15 157/88 115 95 % None (Room air) Lying   02/08/23 1000 -- 65 16 155/89 114 96 % None (Room air) Lying   02/08/23 0930 -- 70 20 153/76 -- 98 % None (Room air) --   02/08/23 0923 -- -- -- 160/76 -- -- -- --   02/08/23 0920 -- 71 18 160/76 -- 98 % None (Room air) Lying   02/08/23 0905 -- 79 15 167/88 -- 98 % None (Room air) Lying   02/08/23 0850 -- 82 18 182/101 Abnormal  -- 98 % None (Room air) Lying   02/08/23 0838 -- -- -- 213/101 Abnormal  -- -- -- --       Pertinent Labs/Diagnostic Test Results:   MRI brain wo contrast   Final Result by Carlito Balderas MD (02/08 2003)      No acute intracranial abnormality  Mild chronic microangiopathy  Known tiny focal outpouching in expected origin of left posterior communicating artery region was better evaluated on same-day CTA stroke alert head neck  Workstation performed: [id number]         CT stroke alert brain   Final Result by Gil Restrepo DO (02/08 0919)      No acute intracranial abnormality  Findings were directly communicated with Nehemias Torrez at approximately 9:15 AM via confirmed Tiger text  Workstation performed: [id number]         CTA stroke alert (head/neck)   Final Result by Gil Restrepo DO (02/08 7969)      No stenosis, occlusion or thrombosis of the cervical or intracranial vasculature  Focal outpouching at the expected location of the left P-comm origin measuring 3 mm in length and 1 5 mm at its base  Although this could represent an infundibulum of an incompletely visualized posterior communicating artery, aneurysm is not excluded      Recommend consultation with the Neurovascular [place], a division of [address] [place] [address] Neuroscience at [phone]  Incidental thyroid nodule(s) for which nonemergent thyroid ultrasound is recommended  Findings were directly communicated with Su Pringle at approximately 9:15 AM via confirmed Tiger text  Workstation performed: [id number]           02/08 EKG result: NSR    02/09 ECHO:    Left Ventricle: Technically difficult study  Echo enhancement was utilized to improve endocardial border definition  The left ventricle demonstrates normal wall thickness with normal LV cavity size and systolic function  Estimated ejection fraction 60%  No regional wall motion abnormality identified  Normal end-diastolic pressure estimated with unremarkable diastolic relaxation pattern  •  Right Ventricle: Normal right ventricular size and function  •  Atrial Septum: No evident interatrial flow by color Doppler or agitated saline analysis  •  Aortic Valve: Aortic valve is not well seen in short axis planes  No obvious flow abnormality by Doppler evaluation  •  Mitral Valve: Normal-appearing mitral leaflets with preserved excursion and trace insufficiency  •  Tricuspid Valve: Normal-appearing tricuspid leaflets with trace insufficiency  No adequate spectral envelope for estimating pulmonary pressure      Compared to study performed March 4780, LV systolic function remains normal   Ejection fraction at that time 60 to 65%  The right ventricle was described as having normal size and function    Trace mitral insufficiency was noted      Date and Time Eye Opening Best Verbal Response Best Motor Response Jeanne Coma Scale Score   02/09/23 0943 4 5 6 15   02/09/23 0543 4 5 6 15   02/09/23 0143 4 5 6 15   02/08/23 2143 4 5 6 15   02/08/23 1943 4 5 6 15   02/08/23 1743 4 5 6 15   02/08/23 1543 4 5 6 15   02/08/23 1343 4 5 6 15   02/08/23 1243 4 5 6 15   02/08/23 1143 4 5 6 15   02/08/23 1030 4 5 6 15   02/08/23 0920 4 5 6 15 02/08/23 0905 4 5 6 15   02/08/23 0850 4 5 6 15   02/08/23 0847 4 5 6 15   02/08/23 0844 4 5 6 15       Results from last 7 days   Lab Units 02/08/23  0856   SARS-COV-2  Negative     Results from last 7 days   Lab Units 02/08/23  0856   WBC Thousand/uL 8 01   HEMOGLOBIN g/dL 13 7   HEMATOCRIT % 41 3   PLATELETS Thousands/uL 321         Results from last 7 days   Lab Units 02/08/23  0856   SODIUM mmol/L 139   POTASSIUM mmol/L 4 0   CHLORIDE mmol/L 104   CO2 mmol/L 30   ANION GAP mmol/L 5   BUN mg/dL 13   CREATININE mg/dL 0 74   EGFR ml/min/1 73sq m 90   CALCIUM mg/dL 9 3     Results from last 7 days   Lab Units 02/08/23  0856   AST U/L 11*   ALT U/L 13   ALK PHOS U/L 59   TOTAL PROTEIN g/dL 7 0   ALBUMIN g/dL 4 1   TOTAL BILIRUBIN mg/dL 0 96   BILIRUBIN DIRECT mg/dL 0 10         Results from last 7 days   Lab Units 02/08/23  0856   GLUCOSE RANDOM mg/dL 199*         Results from last 7 days   Lab Units 02/09/23  0511   HEMOGLOBIN A1C % 8 0*   EAG mg/dl 183           Results from last 7 days   Lab Units 02/08/23  0856   HS TNI 0HR ng/L 3         Results from last 7 days   Lab Units 02/08/23  0856   PROTIME seconds 13 5   INR  1 01   PTT seconds 25                Results from last 7 days   Lab Units 02/09/23  0502   CLARITY UA  Clear   COLOR UA  Yellow   SPEC GRAV UA  >=1 030   PH UA  6 0   GLUCOSE UA mg/dl 500 (1/2%)*   KETONES UA mg/dl Negative   BLOOD UA  Negative   PROTEIN UA mg/dl Negative   NITRITE UA  Negative   BILIRUBIN UA  Small*   UROBILINOGEN UA E U /dl 0 2   LEUKOCYTES UA  Negative   WBC UA /hpf 4-10*   RBC UA /hpf 0-1*   BACTERIA UA /hpf Occasional   EPITHELIAL CELLS WET PREP /hpf Occasional     Results from last 7 days   Lab Units 02/08/23  0856   INFLUENZA A PCR  Negative   INFLUENZA B PCR  Negative   RSV PCR  Negative       ED Treatment:   Medication Administration from 02/08/2023 0825 to 02/08/2023 1134       Date/Time Order Dose Route Action     02/08/2023 0938 EST meclizine (ANTIVERT) tablet 25 mg 25 mg Oral Given     02/08/2023 0858 EST iohexol (OMNIPAQUE) 350 MG/ML injection (SINGLE-DOSE) 85 mL 85 mL Intravenous Given        Past Medical History:   Diagnosis Date   • DVT (deep vein thrombosis) in pregnancy      Present on Admission:  • History of pulmonary embolism      Admitting Diagnosis: Dizziness [R42]  Slurred speech [R47 81]  Hypertension, unspecified type [I10]  Age/Sex: 65 y o  female  Admission Orders:  SCD  48 hr telemetry monitoring  Baseline NIH stroke scale on admission, reassess Q24H x 2 D, Nursing dysphagia assessment prior to staring diet, neuro checks  VS q1h x 4h, VS q2h x 8h, VS q4h x 72 h, VS q8h if stable    Scheduled Medications:  amLODIPine, 5 mg, Oral, Daily  apixaban, 5 mg, Oral, BID  atorvastatin, 40 mg, Oral, QPM  meclizine, 12 5 mg, Oral, Q8H [address]  melatonin, 3 mg, Oral, HS      Continuous IV Infusions: none     PRN Meds:  acetaminophen, 650 mg, Oral, Q6H PRN 02/09 x 1  albuterol, 2 puff, Inhalation, Q4H PRN        IP CONSULT TO NEUROLOGY  IP CONSULT TO CASE MANAGEMENT  IP CONSULT TO NUTRITION SERVICES    Network Utilization Review Department  ATTENTION: Please call with any questions or concerns to [phone] and carefully listen to the prompts so that you are directed to the right person  All voicemails are confidential   Omar Hutton all requests for admission clinical reviews, approved or denied determinations and any other requests to dedicated fax number below belonging to the campus where the patient is receiving treatment   List of dedicated fax numbers for the Facilities:  [address] DENIALS (Administrative/Medical Necessity) [phone]   [address] [place] [address] (Maternity/NICU/Pediatrics) [phone]   [address] [facility] [address] [place] [place] [address] [facility] [address] [facility] [address] [phone]

## 2023-02-09 NOTE — DISCHARGE SUMMARY
114 Leonela Agrawal  [facility] 1965, 65 y o  female MRN: [id number]  Unit/Bed#: -01 Encounter: [id number]  Primary Care Provider: Raza Camargo DO   Date and time admitted to hospital: 2/8/2023  8:40 AM    * Vertigo  Assessment & Plan  · Vertigo and dysarthria  Which has gotten better stroke alert was called CT of the brain was negative CTA revealed- Focal outpouching at the expected location of the left P-comm origin measuring 3 mm in length and 1 5 mm at its base  Although this could represent an infundibulum of an incompletely visualized posterior communicating artery, aneurysm is not excluded  Recommend consultation with the [facility], a division of [facility] for Neuroscience at [phone]  Discussed above with neurologyits small NTD at this time needs to be followed outpatient   · Gated for acute stroke  All symptoms have resolved she was able to ambulate without any issues  Reviewed neuro evaluation most likely as well peripheral will give her some meclizine as needed she does have a cold could have caused it  · The echo without pfo  · In terms of the CTA finding discussed with neurology and the patient CT should be repeated on outpatient basis in 1 year for which I told her to have her PCP do that  I also discussed with her that her hemoglobin A1c went up from April to 8 and her went up to 175 LDL is less than 100  Haroldo with her better diet control and to discuss her diabetes with her primary care physician of her choice of medication and just watch her diet as she is not keen on starting metformin at this time due to her causing diarrhea    · Associated hypertensive urgency could have caused that as well although she was previously on lisinopril prior to gastric sleeve blood pressure is uncontrolled lisinopril has been started for hypertensive urgency has improved she needs to continue to keep follow-up with primary care physician if needed for any blood pressure adjustment    Thyroid nodule  Assessment & Plan  · Speak to the patient about the incidental findings of the thyroid nodule and I did order an outpatient ultrasound of the thyroid for which the results should be followed by her primary care physician    Type 2 diabetes mellitus, without long-term current use of insulin ([facility] )  Assessment & Plan  Lab Results   Component Value Date    HGBA1C 8 0 (H) 02/09/2023       No results for input(s): POCGLU in the last 72 hours  Blood Sugar Average: Last 72 hrs:  · Prior to gastric sleeve she was on metformin for which she was not able to tolerate as she had diarrhea  Now her hemoglobin A1c back in April was 7 1 currently is at 8 her average sugars 180s not keen on restarting the medication due to side effects and I tell her to for now keep a good diet control and next week follow-up with her primary care physician to discuss starting p o  regimen other than metformin for her diabetes    Hypertensive urgency  Assessment & Plan  · Presentation  Patient does have a history of hypertension which has resolved after gastric sleeve previously was on lisinopril 10 mg daily hypertensive urgency resolved but her blood pressure is 140-160 which is not ideal and could have caused the symptoms    I did restart her lisinopril 10 mg daily I told her that to have a follow-up next week to see if the medication have to be adjusted    History of pulmonary embolism  Assessment & Plan  · And DVTs continue Eliquis 5 mg p o  twice daily      Medical Problems     Resolved Problems  Date Reviewed: 2/9/2023   None       Discharging Physician / Practitioner: Lester Ceballos MD  PCP: Landy Doyle DO  Admission Date:   Admission Orders (From admission, onward)     Ordered        02/08/23 0936  INPATIENT ADMISSION  Once                      Discharge Date: 02/09/23    Consultations During Hospital Stay:  · Neurology    Procedures Performed: · none    Significant Findings / Test Results:   · CT head CTA head and neck-a CT head is negative for any acute abnormality/No stenosis, occlusion or thrombosis of the cervical or intracranial vasculature      Focal outpouching at the expected location of the left P-comm origin measuring 3 mm in length and 1 5 mm at its base  Although this could represent an infundibulum of an incompletely visualized posterior communicating artery, aneurysm is not excluded  Recommend consultation with the Neurovascular Center, a division of [facility] for Neuroscience at [phone]      Incidental thyroid nodule(s) for which nonemergent thyroid ultrasound is recommended  · Mri of brain-    No acute intracranial abnormality      Mild chronic microangiopathy      Known tiny focal outpouching in expected origin of left posterior communicating artery region was better evaluated on same-day CTA stroke alert head neck  · 2D echo-•  Left Ventricle: Technically difficult study  Echo enhancement was utilized to improve endocardial border definition  The left ventricle demonstrates normal wall thickness with normal LV cavity size and systolic function  Estimated ejection fraction 60%  No regional wall motion abnormality identified  Normal end-diastolic pressure estimated with unremarkable diastolic relaxation pattern  •  Right Ventricle: Normal right ventricular size and function  •  Atrial Septum: No evident interatrial flow by color Doppler or agitated saline analysis  •  Aortic Valve: Aortic valve is not well seen in short axis planes  No obvious flow abnormality by Doppler evaluation  •  Mitral Valve: Normal-appearing mitral leaflets with preserved excursion and trace insufficiency  •  Tricuspid Valve: Normal-appearing tricuspid leaflets with trace insufficiency  No adequate spectral envelope for estimating pulmonary pressure      Incidental Findings:   · See above  · I reviewed the above mentioned incidental findings with the patient and/or family and they expressed understanding  Test Results Pending at Discharge (will require follow up):   · none     Outpatient Tests Requested:  · Us of thyroid    Complications:  none    Reason for Admission: Vertigo    Hospital Course:   Danitza Medeiros is a 65 y o  female patient who originally presented to the hospital on 2/8/2023 due to acute onset of disequilibrium and dysarthria which improved on arrival CT of the brain was negative CTA shows a 3 mm possible aneurysm in the PCA territory for which she should follow-up with a CTA and repeat in 1 year and that could be done by the PCP  She had an MRI done which was negative all her symptoms have resolved most likely peripheral she has had a URI recently  Meclizine did help we will send her on some as needed's evaluated by PT OT cleared to go also found to have suboptimal diabetes but which got better and suboptimal triglycerides which got better after her gastric sleeve I did discuss with her her hemoglobin A1c and to keep a diet for now she does not want to start the metformin again and will discuss with her PCP regarding other regimen starting next week also she was found to have hypertensive urgency which could have acted on her imbalance for which she has resolved her blood pressure has improved but suboptimal she was started on lisinopril 10 mg daily and she should follow-up with her primary care to have blood pressure reevaluated and see if the medication has to be adjusted that was medical cleared to be discharged home 2D echo without any PFO    The patient, initially admitted to the hospital as inpatient, was discharged earlier than expected given the following: Improved earlier than expected  Please see above list of diagnoses and related plan for additional information       Condition at Discharge: stable    Discharge Day Visit / Exam:   Subjective: Patient seen and examined denies any vertigo symptoms her headache in the morning just improved she does had a 1 episode of diarrhea  No abdominal pain  Vitals: Blood Pressure: 163/86 (02/09/23 1051)  Pulse: 91 (02/09/23 1051)  Temperature: 97 5 °F (36 4 °C) (02/09/23 0946)  Temp Source: Temporal (02/09/23 0543)  Respirations: 19 (02/09/23 1051)  Height: 5' 7" (170 2 cm) (02/09/23 0745)  Weight - Scale: 120 kg (264 lb) (02/09/23 0745)  SpO2: 96 % (02/09/23 1051)  Exam:   Physical Exam  Vitals and nursing note reviewed  Constitutional:       General: She is not in acute distress  Appearance: She is well-developed  HENT:      Head: Normocephalic and atraumatic  Eyes:      Conjunctiva/sclera: Conjunctivae normal    Cardiovascular:      Rate and Rhythm: Normal rate and regular rhythm  Heart sounds: No murmur heard  Pulmonary:      Effort: Pulmonary effort is normal  No respiratory distress  Breath sounds: Normal breath sounds  No wheezing or rales  Abdominal:      General: Bowel sounds are normal  There is no distension  Palpations: Abdomen is soft  Tenderness: There is no abdominal tenderness  Musculoskeletal:         General: No swelling  Cervical back: Neck supple  Skin:     General: Skin is warm and dry  Capillary Refill: Capillary refill takes less than 2 seconds  Neurological:      Mental Status: She is alert and oriented to person, place, and time  Psychiatric:         Mood and Affect: Mood normal          Discussion with Family:patient    Discharge instructions/Information to patient and family:   See after visit summary for information provided to patient and family  Provisions for Follow-Up Care:  See after visit summary for information related to follow-up care and any pertinent home health orders  Disposition:   Home    Planned Readmission: no     Discharge Statement:  I spent >35 minutes discharging the patient  This time was spent on the day of discharge   I had direct contact with the patient on the day of discharge  Greater than 50% of the total time was spent examining patient, answering all patient questions, arranging and discussing plan of care with patient as well as directly providing post-discharge instructions  Additional time then spent on discharge activities  Discharge Medications:  See after visit summary for reconciled discharge medications provided to patient and/or family        **Please Note: This note may have been constructed using a voice recognition system**

## 2023-02-09 NOTE — SPEECH THERAPY NOTE
Speech Language/Pathology  Consult received  Records reviewed  Pt admitted c symptoms concerning for CVA/TIA  Pt passed RN Dysphagia Assessment  Communication deficits denied  MRI results "No acute intracranial abnormality  Mild chronic microangiopathy  Known tiny focal outpouching in expected origin of left posterior communicating artery region was better evaluated on same-day CTA stroke alert head neck   "   No additional inpatient Speech Pathology evaluation appears indicated at this time  Please re-consult if additional concerns arise  Thank you      Ximena Garcia Son[address] 88 CCC-SLP  2/9/2023

## 2023-02-09 NOTE — ASSESSMENT & PLAN NOTE
· Presentation  Patient does have a history of hypertension which has resolved after gastric sleeve previously was on lisinopril 10 mg daily hypertensive urgency resolved but her blood pressure is 140-160 which is not ideal and could have caused the symptoms    I did restart her lisinopril 10 mg daily I told her that to have a follow-up next week to see if the medication have to be adjusted

## 2023-02-09 NOTE — ASSESSMENT & PLAN NOTE
· Speak to the patient about the incidental findings of the thyroid nodule and I did order an outpatient ultrasound of the thyroid for which the results should be followed by her primary care physician

## 2023-02-09 NOTE — OCCUPATIONAL THERAPY NOTE
Occupational Therapy Screen         Patient Name: Maria Fernanda Lemus  [id number] Date: 2/9/2023  Problem List  Principal Problem:    Stroke-like symptoms  Active Problems:    History of pulmonary embolism    Dizziness          02/09/23 0820   Note Type   Note type Screen       OT orders received  Chart review completed  Pt admitted to [address] [place] [address] 2/8/2023 with Dx: stroke-like symptoms  Pt reports having dizziness, slurred speech and a headache  Pt demonstrating ability to complete ADLs and in room ambulation @ I with no difficulty noted  Pt reports all symptoms resolved other than "I still have a migraine"  Pt verbalizes no concerns with returning home at this time  D/c OT effective 2/9/2023  If new concerns arise, please re-consult         Radhika Reynaga, OTR/L

## 2023-02-09 NOTE — PLAN OF CARE
Problem: MOBILITY - ADULT  Goal: Maintain or return to baseline ADL function  Description: INTERVENTIONS:  -  Assess patient's ability to carry out ADLs; assess patient's baseline for ADL function and identify physical deficits which impact ability to perform ADLs (bathing, care of mouth/teeth, toileting, grooming, dressing, etc )  - Assess/evaluate cause of self-care deficits   - Assess range of motion  - Assess patient's mobility; develop plan if impaired  - Assess patient's need for assistive devices and provide as appropriate  - Encourage maximum independence but intervene and supervise when necessary  - Involve family in performance of ADLs  - Assess for home care needs following discharge   - Consider OT consult to assist with ADL evaluation and planning for discharge  - Provide patient education as appropriate  Outcome: Progressing  Goal: Maintains/Returns to pre admission functional level  Description: INTERVENTIONS:  - Perform BMAT or MOVE assessment daily    - Set and communicate daily mobility goal to care team and patient/family/caregiver     - Collaborate with rehabilitation services on mobility goals if consulted    Problem: PAIN - ADULT  Goal: Verbalizes/displays adequate comfort level or baseline comfort level  Description: Interventions:  - Encourage patient to monitor pain and request assistance  - Assess pain using appropriate pain scale  - Administer analgesics based on type and severity of pain and evaluate response  - Implement non-pharmacological measures as appropriate and evaluate response  - Consider cultural and social influences on pain and pain management  - Notify physician/advanced practitioner if interventions unsuccessful or patient reports new pain  Outcome: Progressing   - Out of bed for toileting  - Record patient progress and toleration of activity level   Outcome: Progressing     Problem: INFECTION - ADULT  Goal: Absence of fever/infection during neutropenic period  Description: INTERVENTIONS:  - Monitor WBC    Outcome: Progressing  Goal: Absence or prevention of progression during hospitalization  Description: INTERVENTIONS:  - Assess and monitor for signs and symptoms of infection  - Monitor lab/diagnostic results  - Monitor all insertion sites, i e  indwelling lines, tubes, and drains  - Monitor endotracheal if appropriate and nasal secretions for changes in amount and color  - [place] appropriate cooling/warming therapies per order  - Administer medications as ordered  - Instruct and encourage patient and family to use good hand hygiene technique  - Identify and instruct in appropriate isolation precautions for identified infection/condition  Outcome: Progressing

## 2023-02-09 NOTE — ASSESSMENT & PLAN NOTE
· Vertigo and dysarthria  Which has gotten better stroke alert was called CT of the brain was negative CTA revealed- Focal outpouching at the expected location of the left P-comm origin measuring 3 mm in length and 1 5 mm at its base  Although this could represent an infundibulum of an incompletely visualized posterior communicating artery, aneurysm is not excluded  Recommend [facility] with [facility] a [facility] of [address] [place] [address] for Neuroscience at [phone]  Discussed above with neurologyits small NTD at this time needs to be followed outpatient   · Gated for acute stroke  All symptoms have resolved she was able to ambulate without any issues  Reviewed neuro evaluation most likely as well peripheral will give her some meclizine as needed she does have a cold could have caused it  · The echo without pfo  · In terms of the CTA finding discussed with neurology and the patient CT should be repeated on outpatient basis in 1 year for which I told her to have her PCP do that  I also discussed with her that her hemoglobin A1c went up from April to 8 and her went up to 175 LDL is less than 100  [place] with her better diet control and to discuss her diabetes with her primary care physician of her choice of medication and just watch her diet as she is not keen on starting metformin at this time due to her causing diarrhea    · Associated hypertensive urgency could have caused that as well although she was previously on lisinopril prior to gastric sleeve blood pressure is uncontrolled lisinopril has been started for hypertensive urgency has improved she needs to continue to keep follow-up with primary care physician if needed for any blood pressure adjustment

## 2023-02-09 NOTE — PLAN OF CARE
Problem: MOBILITY - ADULT  Goal: Maintain or return to baseline ADL function  Description: INTERVENTIONS:  -  Assess patient's ability to carry out ADLs; assess patient's baseline for ADL function and identify physical deficits which impact ability to perform ADLs (bathing, care of mouth/teeth, toileting, grooming, dressing, etc )  - Assess/evaluate cause of self-care deficits   - Assess range of motion  - Assess patient's mobility; develop plan if impaired  - Assess patient's need for assistive devices and provide as appropriate  - Encourage maximum independence but intervene and supervise when necessary  - Involve family in performance of ADLs  - Assess for home care needs following discharge   - Consider OT consult to assist with ADL evaluation and planning for discharge  - Provide patient education as appropriate  Outcome: Progressing  Goal: Maintains/Returns to pre admission functional level  Description: INTERVENTIONS:  - Perform BMAT or MOVE assessment daily    - Set and communicate daily mobility goal to care team and patient/family/caregiver     - Collaborate with rehabilitation services on mobility goals if consulted  - Out of bed for toileting  - Record patient progress and toleration of activity level   Outcome: Progressing     Problem: INFECTION - ADULT  Goal: Absence of fever/infection during neutropenic period  Description: INTERVENTIONS:  - Monitor WBC    Outcome: Progressing  Goal: Absence or prevention of progression during hospitalization  Description: INTERVENTIONS:  - Assess and monitor for signs and symptoms of infection  - Monitor lab/diagnostic results  - Monitor all insertion sites, i e  indwelling lines, tubes, and drains  - Monitor endotracheal if appropriate and nasal secretions for changes in amount and color  - [place] appropriate cooling/warming therapies per order  - Administer medications as ordered  - Instruct and encourage patient and family to use good hand hygiene technique  - Identify and instruct in appropriate isolation precautions for identified infection/condition  Outcome: Progressing     Problem: Neurological Deficit  Goal: Neurological status is stable or improving  Description: Interventions:  - Monitor and assess patient's level of consciousness, motor function, sensory function, and level of assistance needed for ADLs  - Monitor and report changes from baseline  Collaborate with interdisciplinary team to initiate plan and implement interventions as ordered  - Provide and maintain a safe environment  - Consider seizure precautions  - Consider fall precautions  - Consider aspiration precautions  - Consider bleeding precautions  Outcome: Progressing     Problem: Activity Intolerance/Impaired Mobility  Goal: Mobility/activity is maintained at optimum level for patient  Description: Interventions:  - Assess and monitor patient  barriers to mobility and need for assistive/adaptive devices  - Assess patient's emotional response to limitations  - Collaborate with interdisciplinary team and initiate plans and interventions as ordered  - Encourage independent activity per ability   - Maintain proper body alignment  - Perform active/passive rom as tolerated/ordered  - Plan activities to conserve energy   - Turn patient as appropriate  Outcome: Progressing     Problem: Communication Impairment  Goal: Ability to express needs and understand communication  Description: Assess patient's communication skills and ability to understand information  Patient will demonstrate use of effective communication techniques, alternative methods of communication and understanding even if not able to speak  - Encourage communication and provide alternate methods of communication as needed  - Collaborate with case management/ for discharge needs  - Include patient/family/caregiver in decisions related to communication    Outcome: Progressing     Problem: Potential for Aspiration  Goal: Non-ventilated patient's risk of aspiration is minimized  Description: Assess and monitor vital signs, respiratory status, and labs (WBC)  Monitor for signs of aspiration (tachypnea, cough, rales, wheezing, cyanosis, fever)  - Assess and monitor patient's ability to swallow  - Place patient up in chair to eat if possible  - HOB up at 90 degrees to eat if unable to get patient up into chair   - Supervise patient during oral intake  - Instruct patient/ family to take small bites  - Instruct patient/ family to take small single sips when taking liquids  - Follow patient-specific strategies generated by speech pathologist   Outcome: Progressing  Goal: Ventilated patient's risk of aspiration is minimized  Description: Assess and monitor vital signs, respiratory status, airway cuff pressure, and labs (WBC)  Monitor for signs of aspiration (tachypnea, cough, rales, wheezing, cyanosis, fever)  - Elevate head of bed 30 degrees if patient has tube feeding   - Monitor tube feeding  Outcome: Progressing     Problem: Nutrition  Goal: Nutrition/Hydration status is improving  Description: Monitor and assess patient's nutrition/hydration status for malnutrition (ex- brittle hair, bruises, dry skin, pale skin and conjunctiva, muscle wasting, smooth red tongue, and disorientation)  Collaborate with interdisciplinary team and initiate plan and interventions as ordered  Monitor patient's weight and dietary intake as ordered or per policy  Utilize nutrition screening tool and intervene per policy  Determine patient's food preferences and provide high-protein, high-caloric foods as appropriate  - Assist patient with eating   - Allow adequate time for meals   - Encourage patient to take dietary supplement as ordered  - Collaborate with clinical nutritionist   - Include patient/family/caregiver in decisions related to nutrition    Outcome: Progressing     Problem: PAIN - ADULT  Goal: Verbalizes/displays adequate comfort level or baseline comfort level  Description: Interventions:  - Encourage patient to monitor pain and request assistance  - Assess pain using appropriate pain scale  - Administer analgesics based on type and severity of pain and evaluate response  - Implement non-pharmacological measures as appropriate and evaluate response  - Consider cultural and social influences on pain and pain management  - Notify physician/advanced practitioner if interventions unsuccessful or patient reports new pain  Outcome: Progressing     Problem: SAFETY ADULT  Goal: Maintain or return to baseline ADL function  Description: INTERVENTIONS:  -  Assess patient's ability to carry out ADLs; assess patient's baseline for ADL function and identify physical deficits which impact ability to perform ADLs (bathing, care of mouth/teeth, toileting, grooming, dressing, etc )  - Assess/evaluate cause of self-care deficits   - Assess range of motion  - Assess patient's mobility; develop plan if impaired  - Assess patient's need for assistive devices and provide as appropriate  - Encourage maximum independence but intervene and supervise when necessary  - Involve family in performance of ADLs  - Assess for home care needs following discharge   - Consider OT consult to assist with ADL evaluation and planning for discharge  - Provide patient education as appropriate  Outcome: Progressing  Goal: Maintains/Returns to pre admission functional level  Description: INTERVENTIONS:  - Perform BMAT or MOVE assessment daily    - Set and communicate daily mobility goal to care team and patient/family/caregiver     - Collaborate with rehabilitation services on mobility goals if consulted  - Out of bed for toileting  - Record patient progress and toleration of activity level   Outcome: Progressing  Goal: Patient will remain free of falls  Description: INTERVENTIONS:  - Educate patient/family on patient safety including physical limitations  - Instruct patient to call for assistance with activity   - Consult OT/PT to assist with strengthening/mobility   - Keep Call bell within reach  - Keep bed low and locked with side rails adjusted as appropriate  - Keep care items and personal belongings within reach  - Initiate and maintain comfort rounds  - Make Fall Risk Sign visible to staff  - Apply yellow socks and bracelet for high fall risk patients  - Consider moving patient to room near nurses station  Outcome: Progressing     Problem: DISCHARGE PLANNING  Goal: Discharge to home or other facility with appropriate resources  Description: INTERVENTIONS:  - Identify barriers to discharge w/patient and caregiver  - Arrange for needed discharge resources and transportation as appropriate  - Identify discharge learning needs (meds, wound care, etc )  - Arrange for interpretive services to assist at discharge as needed  - Refer to Case Management Department for coordinating discharge planning if the patient needs post-hospital services based on physician/advanced practitioner order or complex needs related to functional status, cognitive ability, or social support system  Outcome: Progressing     Problem: Knowledge Deficit  Goal: Patient/family/caregiver demonstrates understanding of disease process, treatment plan, medications, and discharge instructions  Description: Complete learning assessment and assess knowledge base    Interventions:  - Provide teaching at level of understanding  - Provide teaching via preferred learning methods  Outcome: Progressing

## 2023-02-16 NOTE — UTILIZATION REVIEW
NOTIFICATION OF ADMISSION DISCHARGE   This is a Notification of Discharge from [address]  Please be advised that this patient has been discharge from our facility  Below you will find the admission and discharge date and time including the patient’s disposition  UTILIZATION REVIEW CONTACT:  [address] [place]  Utilization   Network Utilization Review Department  Phone: [phone] x carefully listen to the prompts  All voicemails are confidential   Email: [email or web address]     ADMISSION INFORMATION  PRESENTATION DATE: 2/8/2023  8:40 AM  OBERVATION ADMISSION DATE:   INPATIENT ADMISSION DATE: 2/8/23  9:36 AM   DISCHARGE DATE: 2/9/2023  2:00 PM   DISPOSITION:Home/Self Care    IMPORTANT INFORMATION:  Send all requests for admission clinical reviews, approved or denied determinations and any other requests to dedicated fax number below belonging to the campus where the patient is receiving treatment   List of dedicated fax numbers:  [address] DENIALS (Administrative/Medical Necessity) [phone]   [address] (Maternity/NICU/Pediatrics) [phone]   [facility] [phone]   [facility] [phone]   [address] [phone]   [address] [facility] [phone] [address] [facility] [phone]   [address] [place] [address] 119 [phone]   [facility]  [phone]   [address] [facility] [phone]   [facility] [address] [facility] [phone]

## 2023-03-08 ENCOUNTER — HOSPITAL ENCOUNTER (OUTPATIENT)
Dept: ULTRASOUND IMAGING | Facility: HOSPITAL | Age: 58
Discharge: HOME | End: 2023-03-08
Attending: FAMILY MEDICINE

## 2023-03-08 DIAGNOSIS — E04.1 THYROID NODULE: ICD-10-CM

## 2023-05-02 ENCOUNTER — HOSPITAL ENCOUNTER (OUTPATIENT)
Dept: ULTRASOUND IMAGING | Facility: HOSPITAL | Age: 58
Discharge: HOME | End: 2023-05-02

## 2023-05-02 DIAGNOSIS — E04.1 NONTOXIC SINGLE THYROID NODULE: ICD-10-CM

## 2023-06-02 ENCOUNTER — HOSPITAL ENCOUNTER (EMERGENCY)
Facility: HOSPITAL | Age: 58
Discharge: HOME | End: 2023-06-02
Attending: EMERGENCY MEDICINE
Payer: COMMERCIAL

## 2023-06-02 ENCOUNTER — APPOINTMENT (EMERGENCY)
Dept: RADIOLOGY | Facility: HOSPITAL | Age: 58
End: 2023-06-02
Payer: COMMERCIAL

## 2023-06-02 ENCOUNTER — APPOINTMENT (EMERGENCY)
Dept: CT IMAGING | Facility: HOSPITAL | Age: 58
End: 2023-06-02
Payer: COMMERCIAL

## 2023-06-02 VITALS
TEMPERATURE: 98 F | WEIGHT: 264 LBS | SYSTOLIC BLOOD PRESSURE: 188 MMHG | OXYGEN SATURATION: 97 % | HEART RATE: 73 BPM | HEIGHT: 67 IN | BODY MASS INDEX: 41.44 KG/M2 | DIASTOLIC BLOOD PRESSURE: 96 MMHG | RESPIRATION RATE: 18 BRPM

## 2023-06-02 DIAGNOSIS — S20.212A CHEST WALL CONTUSION, LEFT, INITIAL ENCOUNTER: ICD-10-CM
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
## 2023-06-02 PROCEDURE — 71260 CT THORAX DX C+: CPT

## 2023-06-02 PROCEDURE — 96374 THER/PROPH/DIAG INJ IV PUSH: CPT

## 2023-06-02 PROCEDURE — 90471 IMMUNIZATION ADMIN: CPT

## 2023-06-02 PROCEDURE — G1004 CDSM NDSC: HCPCS

## 2023-06-02 PROCEDURE — 74177 CT ABD & PELVIS W/CONTRAST: CPT

## 2023-06-02 PROCEDURE — 71045 X-RAY EXAM CHEST 1 VIEW: CPT

## 2023-06-02 PROCEDURE — 90715 TDAP VACCINE 7 YRS/> IM: CPT | Performed by: EMERGENCY MEDICINE

## 2023-06-02 PROCEDURE — 99284 EMERGENCY DEPT VISIT MOD MDM: CPT

## 2023-06-02 RX ORDER — HYDROMORPHONE HCL IN WATER/PF 6 MG/30 ML
0.2 PATIENT CONTROLLED ANALGESIA SYRINGE INTRAVENOUS ONCE
Status: COMPLETED | OUTPATIENT
Start: 2023-06-02 | End: 2023-06-02

## 2023-06-02 RX ORDER — HYDROCODONE BITARTRATE AND ACETAMINOPHEN 5; 325 MG/1; MG/1
1 TABLET ORAL EVERY 6 HOURS PRN
Qty: 10 TABLET | Refills: 0 | Status: SHIPPED | OUTPATIENT
Start: 2023-06-02

## 2023-06-02 RX ORDER — OXYCODONE HYDROCHLORIDE AND ACETAMINOPHEN 5; 325 MG/1; MG/1
1 TABLET ORAL ONCE
Status: COMPLETED | OUTPATIENT
Start: 2023-06-02 | End: 2023-06-02

## 2023-06-02 RX ADMIN — OXYCODONE HYDROCHLORIDE AND ACETAMINOPHEN 1 TABLET: 5; 325 TABLET ORAL at 22:02

## 2023-06-02 RX ADMIN — HYDROMORPHONE HYDROCHLORIDE 0.2 MG: 0.2 INJECTION, SOLUTION INTRAMUSCULAR; INTRAVENOUS; SUBCUTANEOUS at 19:45

## 2023-06-02 RX ADMIN — IOHEXOL 99 ML: 350 INJECTION, SOLUTION INTRAVENOUS at 20:33

## 2023-06-02 RX ADMIN — TETANUS TOXOID, REDUCED DIPHTHERIA TOXOID AND ACELLULAR PERTUSSIS VACCINE, ADSORBED 0.5 ML: 5; 2.5; 8; 8; 2.5 SUSPENSION INTRAMUSCULAR at 19:54

## 2023-06-02 NOTE — ED PROVIDER NOTES
History  Chief Complaint   Patient presents with   • Fall     Pt fell walking up cement steps and stuck left side of chest on steps, reports pain and shortness of breath with a deep breath  Denies head strike  On eliquis daily  Patient is a 67-year-old female presenting to the emergency department complaining of trip and fall with the middle of her left chest landing on a set of cement stairs, she reports some pain in her ribs and difficulty taking a deep breath secondary to pain, she denies any head injury or loss of consciousness, no neck pain, no numbness or tingling to extremities, denies any hip or pelvis pain, no injury to her lower extremities, she does take Eliquis secondary to history of DVT          Prior to Admission Medications   Prescriptions Last Dose Informant Patient Reported? Taking? Melatonin 5 MG TABS   Yes No   Sig: Take by mouth daily at bedtime   albuterol (PROVENTIL HFA,VENTOLIN HFA) 90 mcg/act inhaler   Yes No   Sig: Inhale 2 puffs   apixaban (ELIQUIS) 5 mg   No No   Sig: Take 1 tablet (5 mg total) by mouth 2 (two) times a day   cholecalciferol (VITAMIN D3) 1,000 units tablet   Yes No   Sig: Take 1,000 Units by mouth daily   fexofenadine (ALLEGRA) 180 MG tablet   Yes No   Sig: Take 180 mg by mouth if needed   lisinopril (ZESTRIL) 10 mg tablet   No No   Sig: Take 1 tablet (10 mg total) by mouth daily Do not start before February 10, 2023  meclizine (ANTIVERT) 12 5 MG tablet   No No   Sig: Take 1 tablet (12 5 mg total) by mouth every 8 (eight) hours as needed for dizziness      Facility-Administered Medications: None       Past Medical History:   Diagnosis Date   • DVT (deep vein thrombosis) in pregnancy        Past Surgical History:   Procedure Laterality Date   • ABDOMINAL SURGERY      Gastric Sleeve    •  SECTION         History reviewed  No pertinent family history  I have reviewed and agree with the history as documented      E-Cigarette/Vaping   • E-Cigarette Use Never User      E-Cigarette/Vaping Substances     Social History     Tobacco Use   • Smoking status: Former     Types: Cigarettes     Quit date:      Years since quittin 4   • Smokeless tobacco: Never   Vaping Use   • Vaping Use: Never used   Substance Use Topics   • Alcohol use: Never   • Drug use: Never       Review of Systems   Constitutional: Negative  HENT: Negative  Eyes: Negative  Respiratory: Negative  Cardiovascular: Negative  Gastrointestinal: Negative  Endocrine: Negative  Genitourinary: Negative  Musculoskeletal: Positive for arthralgias  Skin: Negative  Allergic/Immunologic: Negative  Neurological: Negative  Hematological: Negative  Psychiatric/Behavioral: Negative  Physical Exam  Physical Exam  Constitutional:       Appearance: She is well-developed  HENT:      Head: Normocephalic and atraumatic  Eyes:      Conjunctiva/sclera: Conjunctivae normal       Pupils: Pupils are equal, round, and reactive to light  Cardiovascular:      Rate and Rhythm: Normal rate  Pulmonary:      Effort: Pulmonary effort is normal    Chest:          Comments: Tender to palpate in the left anterior chest wall just inferior to the breast, no bony deformity, no crepitus, bilateral breath sounds on auscultation  Abdominal:      Palpations: Abdomen is soft  Musculoskeletal:         General: Normal range of motion  Cervical back: Normal range of motion and neck supple  Skin:     General: Skin is warm and dry  Comments: Multiple small superficial abrasions   Neurological:      Mental Status: She is alert and oriented to person, place, and time           Vital Signs  ED Triage Vitals   Temperature Pulse Respirations Blood Pressure SpO2   23   98 °F (36 7 °C) 90 18 (!) 193/88 98 %      Temp Source Heart Rate Source Patient Position - Orthostatic VS BP Location FiO2 (%)   23 1934 06/02/23 1934 06/02/23 1934 --   Temporal Monitor Sitting Right arm       Pain Score       06/02/23 1945       8           Vitals:    06/02/23 1934 06/02/23 1936 06/02/23 2030 06/02/23 2130   BP: (!) 193/88 (!) 199/87 (!) 193/88 (!) 188/96   Pulse: 90 83 87 73   Patient Position - Orthostatic VS: Sitting  Sitting Lying         Visual Acuity  Visual Acuity    Flowsheet Row Most Recent Value   L Pupil Size (mm) 3   R Pupil Size (mm) 3          ED Medications  Medications   HYDROmorphone HCl (DILAUDID) injection 0 2 mg (0 2 mg Intravenous Given 6/2/23 1945)   tetanus-diphtheria-acellular pertussis (BOOSTRIX) IM injection 0 5 mL (0 5 mL Intramuscular Given 6/2/23 1954)   iohexol (OMNIPAQUE) 350 MG/ML injection (SINGLE-DOSE) 99 mL (99 mL Intravenous Given 6/2/23 2033)   oxyCODONE-acetaminophen (PERCOCET) 5-325 mg per tablet 1 tablet (1 tablet Oral Given 6/2/23 2202)       Diagnostic Studies  Results Reviewed     None                 CT chest abdomen pelvis w contrast   Final Result by Bruna Melvin MD (06/02 2137)      No evidence of traumatic injury  2 mm pulmonary nodule in the left lower lobe  Based on current Fleischner Society 2017 Guidelines on incidental pulmonary nodule, no routine follow-up is needed if the patient is low risk  If the patient is high risk, optional follow-up chest CT at 12    months can be considered  Colonic diverticulosis without evidence of acute diverticulitis  Small hiatal hernia  Workstation performed: [id number]         XR chest 1 view portable   ED Interpretation by Rosa Valenzuela DO (06/02 2036)   No obvious displaced rib fractures, no pneumothorax                 Procedures  Procedures         ED Course  ED Course as of 06/03/23 0105   Fri Jun 02, 2023 2035 XR chest 1 view portable                               SBIRT 22yo+    Flowsheet Row Most Recent Value   Initial Alcohol Screen: US AUDIT-C     1  How often do you have a drink containing alcohol? 0 Filed at: 06/02/2023 1936   2  How many drinks containing alcohol do you have on a typical day you are drinking? 0 Filed at: 06/02/2023 1936   3a  Male UNDER 65: How often do you have five or more drinks on one occasion? 0 Filed at: 06/02/2023 1936   3b  FEMALE Any Age, or MALE 65+: How often do you have 4 or more drinks on one occassion? 0 Filed at: 06/02/2023 1936   Audit-C Score 0 Filed at: 06/02/2023 1936   OLAYINKA: How many times in the past year have you    Used an illegal drug or used a prescription medication for non-medical reasons? Never Filed at: 06/02/2023 1936                    Medical Decision Making  Given work-up, exam and history, low suspicion for intracranial hemorrhage or trauma, carotid or vertebral artery dissection, intrathoracic trauma including pulmonary contusion, blunt cardiac trauma, pneumothorax, hemothorax, cardiac tamponade or rib fractures, interest abdominal trauma, no evidence of liver, spleen or renal lacerations, doubt hollow viscus injury given soft abdomen on exam, no free air seen, consistently normotensive with otherwise stable vital signs, no evidence of extremity fracture, extremity dislocation or crush injury/compartment syndrome    Chest wall contusion, left, initial encounter: acute illness or injury  Fall, initial encounter: acute illness or injury  Amount and/or Complexity of Data Reviewed  Radiology: ordered and independent interpretation performed  Decision-making details documented in ED Course  Risk  OTC drugs  Prescription drug management  Disposition  Final diagnoses:   Fall, initial encounter   Chest wall contusion, left, initial encounter     Time reflects when diagnosis was documented in both MDM as applicable and the Disposition within this note     Time User Action Codes Description Comment    6/2/2023  9:45 PM Page Needle Claudio Rao Kos  MSNN] CBGR, initial encounter     6/2/2023  9:45 PM Donavon Santacruz [[id number]] Chest wall contusion, left, initial encounter       ED Disposition     ED Disposition   Discharge    Condition   Stable    Date/Time   Fri Jun 2, 2023  9:45 PM    Comment   Missy Carvalho discharge to home/self care  Follow-up Information     Follow up With Specialties Details [address], DO Family Medicine In 2 days  [facility] [address]  [phone]            Discharge Medication List as of 6/2/2023  9:47 PM      START taking these medications    Details   HYDROcodone-acetaminophen (Norco) 5-325 mg per tablet Take 1 tablet by mouth every 6 (six) hours as needed for pain for up to 10 doses Max Daily Amount: 4 tablets, Starting Fri 6/2/2023, Normal         CONTINUE these medications which have NOT CHANGED    Details   albuterol (PROVENTIL HFA,VENTOLIN HFA) 90 mcg/act inhaler Inhale 2 puffs, Historical Med      apixaban (ELIQUIS) 5 mg Take 1 tablet (5 mg total) by mouth 2 (two) times a day, Starting Thu 3/4/2021, Until Wed 2/8/2023, Normal      cholecalciferol (VITAMIN D3) 1,000 units tablet Take 1,000 Units by mouth daily, Historical Med      fexofenadine (ALLEGRA) 180 MG tablet Take 180 mg by mouth if needed, Historical Med      lisinopril (ZESTRIL) 10 mg tablet Take 1 tablet (10 mg total) by mouth daily Do not start before February 10, 2023 , Starting Fri 2/10/2023, Normal      meclizine (ANTIVERT) 12 5 MG tablet Take 1 tablet (12 5 mg total) by mouth every 8 (eight) hours as needed for dizziness, Starting Thu 2/9/2023, Normal      Melatonin 5 MG TABS Take by mouth daily at bedtime, Historical Med             No discharge procedures on file      PDMP Review       Value Time User    PDMP Reviewed  Yes 3/3/2021 12:32 PM Fiona Cordova MD          ED Provider  Electronically Signed by           Janeth Manning DO  06/03/23 0106

## 2023-07-13 ENCOUNTER — RX ONLY (RX ONLY)
Age: 58
End: 2023-07-13

## 2023-07-13 ENCOUNTER — OPTICAL OFFICE (OUTPATIENT)
Dept: URBAN - NONMETROPOLITAN AREA CLINIC 4 | Facility: CLINIC | Age: 58
Setting detail: OPHTHALMOLOGY
End: 2023-07-13
Payer: COMMERCIAL

## 2023-07-13 ENCOUNTER — DOCTOR'S OFFICE (OUTPATIENT)
Dept: URBAN - NONMETROPOLITAN AREA CLINIC 1 | Facility: CLINIC | Age: 58
Setting detail: OPHTHALMOLOGY
End: 2023-07-13
Payer: COMMERCIAL

## 2023-07-13 DIAGNOSIS — H52.4: ICD-10-CM

## 2023-07-13 DIAGNOSIS — H52.223: ICD-10-CM

## 2023-07-13 DIAGNOSIS — H52.13: ICD-10-CM

## 2023-07-13 PROBLEM — H53.021: Status: ACTIVE | Noted: 2023-07-13

## 2023-07-13 PROCEDURE — V2781 PROGRESSIVE LENS PER LENS: HCPCS

## 2023-07-13 PROCEDURE — V2205 LENS SPHCY BIFOCAL 4.00D/4.2: HCPCS

## 2023-07-13 PROCEDURE — V2203 LENS SPHCYL BIFOCAL 4.00D/.1: HCPCS

## 2023-07-13 PROCEDURE — 92015 DETERMINE REFRACTIVE STATE: CPT

## 2023-07-13 PROCEDURE — V2025 EYEGLASSES DELUX FRAMES: HCPCS

## 2023-07-13 PROCEDURE — V2755 UV LENS/ES: HCPCS

## 2023-07-13 PROCEDURE — 92014 COMPRE OPH EXAM EST PT 1/>: CPT

## 2023-07-13 PROCEDURE — V2020 VISION SVCS FRAMES PURCHASES: HCPCS

## 2023-07-13 PROCEDURE — V2784 LENS POLYCARB OR EQUAL: HCPCS

## 2023-07-13 ASSESSMENT — REFRACTION_CURRENTRX
OD_CYLINDER: -4.00
OD_VPRISM_DIRECTION: PROGS
OD_OVR_VA: 20/
OD_ADD2: +2.25
OS_SPHERE: PLANO
OS_ADD2: +2.25
OS_CYLINDER: -1.50
OS_VPRISM_DIRECTION: PROGS
OD_SPHERE: +0.25
OD_AXIS: 68
OS_OVR_VA: 20/
OS_AXIS: 92

## 2023-07-13 ASSESSMENT — SPHEQUIV_DERIVED
OD_SPHEQUIV: -2.875
OS_SPHEQUIV: -2
OS_SPHEQUIV: -1.375
OD_SPHEQUIV: -2.5

## 2023-07-13 ASSESSMENT — REFRACTION_MANIFEST
OD_SPHERE: -0.50
OD_VA2: 20/20
OU_VA: 20/25
OD_VA1: 20/40
OS_AXIS: 085
OS_ADD: +2.25
OS_VA1: 20/25+
OD_AXIS: 070
OS_SPHERE: -0.50
OS_CYLINDER: -1.75
OD_ADD: +2.25
OS_VA2: 20/20
OD_CYLINDER: -4.75

## 2023-07-13 ASSESSMENT — MACULA - EPIRETINAL MEMBRANE (ERM): OD_ERM: 1+

## 2023-07-13 ASSESSMENT — REFRACTION_AUTOREFRACTION
OS_CYLINDER: -1.50
OD_AXIS: 67
OS_AXIS: 111
OD_CYLINDER: -3.50
OD_SPHERE: -0.75
OS_SPHERE: -1.25

## 2023-07-13 ASSESSMENT — VISUAL ACUITY
OS_BCVA: 20/40-2
OD_BCVA: 20/25-1

## 2023-07-13 ASSESSMENT — CONFRONTATIONAL VISUAL FIELD TEST (CVF)
OS_FINDINGS: FULL
OD_FINDINGS: FULL

## 2023-07-13 ASSESSMENT — TONOMETRY
OS_IOP_MMHG: 21
OD_IOP_MMHG: 21

## 2024-01-03 ENCOUNTER — HOSPITAL ENCOUNTER (OUTPATIENT)
Dept: NON INVASIVE DIAGNOSTICS | Facility: HOSPITAL | Age: 59
Discharge: HOME | End: 2024-01-03
Payer: COMMERCIAL

## 2024-01-03 DIAGNOSIS — M79.604 PAIN IN BOTH LOWER EXTREMITIES: ICD-10-CM

## 2024-01-03 DIAGNOSIS — M79.605 PAIN IN BOTH LOWER EXTREMITIES: ICD-10-CM

## 2024-01-03 PROCEDURE — 93970 EXTREMITY STUDY: CPT | Performed by: SURGERY

## 2024-01-03 PROCEDURE — 93970 EXTREMITY STUDY: CPT

## 2024-02-02 ENCOUNTER — TELEPHONE (OUTPATIENT)
Dept: NEUROSURGERY | Facility: CLINIC | Age: 59
End: 2024-02-02

## 2024-02-02 NOTE — TELEPHONE ENCOUNTER
2/2/24 - PT CALLED TO MAKE NEW PT APPT FOR ANEURYSM    PT WAS REF BY [facility] 2/8/23     MRI BRAIN 2/8/23 [facility]     INFORMED HER AN  WILL BE IN CONTACT WITH HER

## 2024-02-02 NOTE — TELEPHONE ENCOUNTER
2/2/24 CALLED PT AND LMOM FOR HER TO CB TO ENTER SELF REFERRAL, GO OVER ENDO BRAIN INTAKE AND SCHEDULE WITH AP FOR WORK UP SINCE NO RECENT IMAGING.    PT REFERRED BY [facility] 2/8/23 FOR 3MM ANEURYSM   CTA HEAD AND NECK 2/8/23 [facility]  MRI BRAIN 2/8/23 [facility]  CT HEAD 2/8/23 [facility]

## 2024-02-06 ENCOUNTER — TELEPHONE (OUTPATIENT)
Dept: NEUROSURGERY | Facility: CLINIC | Age: 59
End: 2024-02-06

## 2024-02-19 ENCOUNTER — HOSPITAL ENCOUNTER (OUTPATIENT)
Dept: ULTRASOUND IMAGING | Facility: HOSPITAL | Age: 59
Discharge: HOME | End: 2024-02-19
Payer: COMMERCIAL

## 2024-02-19 DIAGNOSIS — E04.1 NONTOXIC SINGLE THYROID NODULE: ICD-10-CM

## 2024-02-19 PROCEDURE — 76536 US EXAM OF HEAD AND NECK: CPT

## 2024-03-28 PROBLEM — I67.1 INTRACRANIAL ANEURYSM: Status: ACTIVE | Noted: 2024-03-28

## 2024-03-28 NOTE — ASSESSMENT & PLAN NOTE
New evaluation of an intracranial aneurysm  Noted on CTA 2/2023 on evaluation of vertigo and dysarthria, no CVA seen  Notes occasional mild headaches and 1 episode of double vision.  Otherwise no complaints.  Mother had a ruptured aneurysm.  Former smoker.  BP controlled.  Exam: Nonfocal    Imaging:  CTA head/neck 2023: No stenosis, occlusion or thrombosis of the cervical or intracranial vasculature. Focal outpouching at the expected location of the left P-comm origin measuring 3 mm in length and 1.5 mm at its base. Although this could represent an infundibulum of an incompletely visualized posterior communicating artery, aneurysm is not excluded.      Plan:  Reviewed imaging from last year with patient and son.  Possible left PCOM aneurysm noted.  Discussed natural history of aneurysms and risk of rupture.  Discussed genetic component to aneurysms as well as modifiable risk factors such as smoking and HTN.  Given current size and location, risk of rupture is <1%/year per UCAS and <1%/5yrs per ISIUA.  Discussed screening of her siblings as well as all of their children with MRA head. If negative, can be re-screened every 5 years.  Recommend updated imaging to evaluate the current size of aneurysm and discuss any treatment if necessary vs continued surveillance. CTA head ordered. Follow up as joint appointment with endovascular attending once completed.    Call sooner with any questions or concerns.

## 2024-03-29 ENCOUNTER — OFFICE VISIT (OUTPATIENT)
Dept: NEUROSURGERY | Facility: CLINIC | Age: 59
End: 2024-03-29
Payer: COMMERCIAL

## 2024-03-29 VITALS
DIASTOLIC BLOOD PRESSURE: 68 MMHG | SYSTOLIC BLOOD PRESSURE: 100 MMHG | WEIGHT: 213 LBS | HEIGHT: 67 IN | TEMPERATURE: 97.6 F | HEART RATE: 66 BPM | BODY MASS INDEX: 33.43 KG/M2 | RESPIRATION RATE: 18 BRPM | OXYGEN SATURATION: 99 %

## 2024-03-29 DIAGNOSIS — I67.1 INTRACRANIAL ANEURYSM: ICD-10-CM

## 2024-03-29 DIAGNOSIS — I72.9 ANEURYSM (HCC): ICD-10-CM

## 2024-03-29 PROCEDURE — 99204 OFFICE O/P NEW MOD 45 MIN: CPT | Performed by: PHYSICIAN ASSISTANT

## 2024-03-29 RX ORDER — SEMAGLUTIDE 0.68 MG/ML
0.5 INJECTION, SOLUTION SUBCUTANEOUS
COMMUNITY

## 2024-03-29 RX ORDER — URSODIOL 300 MG/1
300 CAPSULE ORAL 2 TIMES DAILY
COMMUNITY

## 2024-03-29 RX ORDER — MULTIVITAMIN
1 TABLET ORAL DAILY
COMMUNITY

## 2024-03-29 RX ORDER — OMEPRAZOLE 20 MG/1
20 CAPSULE, DELAYED RELEASE ORAL DAILY
COMMUNITY
Start: 2024-03-02

## 2024-03-29 NOTE — PROGRESS NOTES
Neurosurgery Office Note  Hernan Bridges 59 y.o. female MRN: [id number]      Assessment/Plan     Intracranial aneurysm  New evaluation of an intracranial aneurysm  Noted on CTA 2/2023 on evaluation of vertigo and dysarthria, no CVA seen    Imaging:  CTA head/neck 2023: No stenosis, occlusion or thrombosis of the cervical or intracranial vasculature. Focal outpouching at the expected location of the left P-comm origin measuring 3 mm in length and 1.5 mm at its base. Although this could represent an infundibulum of an incompletely visualized posterior communicating artery, aneurysm is not excluded.      Plan:         {Assess/PlanSmartLinks:07369}      I have spent a total time of *** minutes on 03/29/24 in caring for this patient including {AMB Counseling Topics:5027540831}.      CHIEF COMPLAINT    Chief Complaint   Patient presents with    New Patient Visit     [facility] ANEURSYM WORK UP FOR NEW CTA       HISTORY    History of Present Illness     59 y.o. year old female     Mother with ruptured brain aneurysm         See Discussion    REVIEW OF SYSTEMS    Review of Systems   Constitutional: Negative.  Negative for fatigue.   HENT:  Negative for tinnitus.    Eyes:  Negative for visual disturbance (had a hard time seeing a license plate while driving, had some blurry vision, this happned once).   Respiratory: Negative.     Cardiovascular: Negative.    Gastrointestinal: Negative.         At times patient will have a hard time moving her bowels and is afraid to push too hard because of the aneurysm    Endocrine: Negative.    Musculoskeletal:  Negative for gait problem.   Skin: Negative.    Allergic/Immunologic: Negative.    Neurological:  Positive for headaches (some discomfort). Negative for dizziness, seizures, speech difficulty, weakness, light-headedness and numbness.   Hematological: Negative.    Psychiatric/Behavioral:  Negative for sleep disturbance.        ROS obtained by MA. Reviewed. See HPI.     Meds/Allergies      Current Outpatient Medications   Medication Sig Dispense Refill    albuterol (PROVENTIL HFA,VENTOLIN HFA) 90 mcg/act inhaler Inhale 2 puffs if needed      apixaban (Eliquis) 5 mg Take 5 mg by mouth 2 (two) times a day      fexofenadine (ALLEGRA) 180 MG tablet Take 180 mg by mouth if needed      Multiple Vitamin (multivitamin) tablet Take 1 tablet by mouth daily      omeprazole (PriLOSEC) 20 mg delayed release capsule Take 20 mg by mouth daily      Ozempic, 0.25 or 0.5 MG/DOSE, 2 MG/3ML injection pen Inject 0.5 mg under the skin every 7 days      ursodiol (ACTIGALL) 300 mg capsule Take 300 mg by mouth 2 (two) times a day      cholecalciferol (VITAMIN D3) 1,000 units tablet Take 1,000 Units by mouth daily (Patient not taking: Reported on 3/29/2024)      HYDROcodone-acetaminophen (Norco) 5-325 mg per tablet Take 1 tablet by mouth every 6 (six) hours as needed for pain for up to 10 doses Max Daily Amount: 4 tablets (Patient not taking: Reported on 3/29/2024) 10 tablet 0    lisinopril (ZESTRIL) 10 mg tablet Take 1 tablet (10 mg total) by mouth daily Do not start before February 10, 2023. (Patient not taking: Reported on 3/29/2024) 30 tablet 0    meclizine (ANTIVERT) 12.5 MG tablet Take 1 tablet (12.5 mg total) by mouth every 8 (eight) hours as needed for dizziness (Patient not taking: Reported on 3/29/2024) 30 tablet 0    Melatonin 5 MG TABS Take by mouth daily at bedtime (Patient not taking: Reported on 3/29/2024)       No current facility-administered medications for this visit.       Allergies   Allergen Reactions    Medical Tape Rash       PAST HISTORY    Past Medical History:   Diagnosis Date    DVT (deep vein thrombosis) in pregnancy     Intracranial aneurysm 2024       Past Surgical History:   Procedure Laterality Date    ABDOMINAL SURGERY  2023    Gastric Sleeve      SECTION         Social History     Tobacco Use    Smoking status: Former     Current packs/day: 0.00     Types: Cigarettes      "Quit date:      Years since quittin.2    Smokeless tobacco: Never   Vaping Use    Vaping status: Never Used   Substance Use Topics    Alcohol use: Never    Drug use: Never       History reviewed. No pertinent family history.      Above history personally reviewed.       EXAM    Vitals:Blood pressure 100/68, pulse 66, temperature 97.6 °F (36.4 °C), temperature source Temporal, resp. rate 18, height 5' 7\" (1.702 m), weight 96.6 kg (213 lb), SpO2 99%.,Body mass index is 33.36 kg/m².     Physical Exam    Neurologic Exam      MEDICAL DECISION MAKING    Imaging Studies:     No results found.    {Results Review Statement:84600}   "

## 2024-03-29 NOTE — PROGRESS NOTES
Neurosurgery Office Note  Hernan Bridges 59 y.o. female MRN: [id number]      Assessment/Plan     Intracranial aneurysm  New evaluation of an intracranial aneurysm  Noted on CTA 2/2023 on evaluation of vertigo and dysarthria, no CVA seen  Notes occasional mild headaches and 1 episode of double vision.  Otherwise no complaints.  Mother had a ruptured aneurysm.  Former smoker.  BP controlled.  Exam: Nonfocal    Imaging:  CTA head/neck 2023: No stenosis, occlusion or thrombosis of the cervical or intracranial vasculature. Focal outpouching at the expected location of the left P-comm origin measuring 3 mm in length and 1.5 mm at its base. Although this could represent an infundibulum of an incompletely visualized posterior communicating artery, aneurysm is not excluded.      Plan:  Reviewed imaging from last year with patient and son.  Possible left PCOM aneurysm noted.  Discussed natural history of aneurysms and risk of rupture.  Discussed genetic component to aneurysms as well as modifiable risk factors such as smoking and HTN.  Given current size and location, risk of rupture is <1%/year per UCAS and <1%/5yrs per ISIUA.  Discussed screening of her siblings as well as all of their children with MRA head. If negative, can be re-screened every 5 years.  Recommend updated imaging to evaluate the current size of aneurysm and discuss any treatment if necessary vs continued surveillance. CTA head ordered. Follow up as joint appointment with endovascular attending once completed.    Call sooner with any questions or concerns.       Diagnoses and all orders for this visit:    Aneurysm (HCC)  -     Ambulatory Referral to Neurosurgery  -     BUN; Future  -     Creatinine, serum; Future  -     CTA head w wo contrast; Future    Intracranial aneurysm    Other orders  -     Multiple Vitamin (multivitamin) tablet; Take 1 tablet by mouth daily  -     omeprazole (PriLOSEC) 20 mg delayed release capsule; Take 20 mg by mouth daily  -      ursodiol (ACTIGALL) 300 mg capsule; Take 300 mg by mouth 2 (two) times a day  -     Ozempic, 0.25 or 0.5 MG/DOSE, 2 MG/3ML injection pen; Inject 0.5 mg under the skin every 7 days  -     apixaban (Eliquis) 5 mg; Take 5 mg by mouth 2 (two) times a day          I have spent a total time of 40 minutes on 03/29/24 in caring for this patient including Diagnostic results, Instructions for management, Patient and family education, Risk factor reductions, Impressions, Counseling / Coordination of care, Documenting in the medical record, Reviewing / ordering tests, medicine, procedures  , and Obtaining or reviewing history  .      CHIEF COMPLAINT    Chief Complaint   Patient presents with    New Patient Visit     [facility] ANEURSYM WORK UP FOR NEW CTA       HISTORY    History of Present Illness     59 y.o. year old female     59-year-old female seen for new evaluation of an intracranial aneurysm.  This was noted incidentally on evaluation of vertigo and dysarthria in February 2023.  No stroke was seen on imaging.  She reports having occasional mild headaches and one episode of double vision.  No other neurologic complaints for today's visit.  She notes a family history of ruptured aneurysm in her mother.  Former smoker.  BP controlled.  History of factor V Leiden with recurrent DVTs and PE on Eliquis.        See Discussion    REVIEW OF SYSTEMS    Review of Systems   Constitutional: Negative.  Negative for fatigue.   HENT:  Negative for tinnitus.    Eyes:  Negative for visual disturbance (had a hard time seeing a license plate while driving, had some blurry vision, this happned once).   Respiratory: Negative.     Cardiovascular: Negative.    Gastrointestinal: Negative.         At times patient will have a hard time moving her bowels and is afraid to push too hard because of the aneurysm    Endocrine: Negative.    Musculoskeletal:  Negative for gait problem.   Skin: Negative.    Allergic/Immunologic: Negative.    Neurological:   Positive for headaches (some discomfort). Negative for dizziness, seizures, speech difficulty, weakness, light-headedness and numbness.   Hematological: Negative.    Psychiatric/Behavioral:  Negative for sleep disturbance.        ROS obtained by DEVON Lee. See HPI.     Meds/Allergies     Current Outpatient Medications   Medication Sig Dispense Refill    albuterol (PROVENTIL HFA,VENTOLIN HFA) 90 mcg/act inhaler Inhale 2 puffs if needed      apixaban (Eliquis) 5 mg Take 5 mg by mouth 2 (two) times a day      fexofenadine (ALLEGRA) 180 MG tablet Take 180 mg by mouth if needed      Multiple Vitamin (multivitamin) tablet Take 1 tablet by mouth daily      omeprazole (PriLOSEC) 20 mg delayed release capsule Take 20 mg by mouth daily      Ozempic, 0.25 or 0.5 MG/DOSE, 2 MG/3ML injection pen Inject 0.5 mg under the skin every 7 days      ursodiol (ACTIGALL) 300 mg capsule Take 300 mg by mouth 2 (two) times a day      cholecalciferol (VITAMIN D3) 1,000 units tablet Take 1,000 Units by mouth daily (Patient not taking: Reported on 3/29/2024)      HYDROcodone-acetaminophen (Norco) 5-325 mg per tablet Take 1 tablet by mouth every 6 (six) hours as needed for pain for up to 10 doses Max Daily Amount: 4 tablets (Patient not taking: Reported on 3/29/2024) 10 tablet 0    lisinopril (ZESTRIL) 10 mg tablet Take 1 tablet (10 mg total) by mouth daily Do not start before February 10, 2023. (Patient not taking: Reported on 3/29/2024) 30 tablet 0    meclizine (ANTIVERT) 12.5 MG tablet Take 1 tablet (12.5 mg total) by mouth every 8 (eight) hours as needed for dizziness (Patient not taking: Reported on 3/29/2024) 30 tablet 0    Melatonin 5 MG TABS Take by mouth daily at bedtime (Patient not taking: Reported on 3/29/2024)       No current facility-administered medications for this visit.       Allergies   Allergen Reactions    Medical Tape Rash       PAST HISTORY    Past Medical History:   Diagnosis Date    DVT (deep vein thrombosis) in  "pregnancy     Intracranial aneurysm 2024       Past Surgical History:   Procedure Laterality Date    ABDOMINAL SURGERY  2023    Gastric Sleeve      SECTION         Social History     Tobacco Use    Smoking status: Former     Current packs/day: 0.00     Types: Cigarettes     Quit date:      Years since quittin.2    Smokeless tobacco: Never   Vaping Use    Vaping status: Never Used   Substance Use Topics    Alcohol use: Never    Drug use: Never       History reviewed. No pertinent family history.      Above history personally reviewed.       EXAM    Vitals:Blood pressure 100/68, pulse 66, temperature 97.6 °F (36.4 °C), temperature source Temporal, resp. rate 18, height 5' 7\" (1.702 m), weight 96.6 kg (213 lb), SpO2 99%.,Body mass index is 33.36 kg/m².     Physical Exam  Vitals reviewed.   Constitutional:       General: She is awake.      Appearance: Normal appearance.   HENT:      Head: Normocephalic and atraumatic.   Eyes:      Extraocular Movements: EOM normal.      Conjunctiva/sclera: Conjunctivae normal.      Pupils: Pupils are equal, round, and reactive to light.   Cardiovascular:      Rate and Rhythm: Normal rate.   Pulmonary:      Effort: Pulmonary effort is normal.   Skin:     General: Skin is warm and dry.   Neurological:      Mental Status: She is alert and oriented to person, place, and time.      Motor: Motor strength is normal.     Coordination: Finger-Nose-Finger Test normal.      Gait: Gait is intact.      Deep Tendon Reflexes:      Reflex Scores:       Bicep reflexes are 2+ on the right side and 2+ on the left side.       Brachioradialis reflexes are 2+ on the right side and 2+ on the left side.       Patellar reflexes are 2+ on the right side and 2+ on the left side.  Psychiatric:         Attention and Perception: Attention and perception normal.         Mood and Affect: Mood and affect normal.         Speech: Speech normal.         Behavior: Behavior normal. Behavior is " cooperative.         Thought Content: Thought content normal.         Cognition and Memory: Cognition and memory normal.         Judgment: Judgment normal.         Neurologic Exam     Mental Status   Oriented to person, place, and time.   Oriented to year and month.   Follows 2 step commands.   Attention: normal. Concentration: normal.   Speech: speech is normal   Level of consciousness: alert  Knowledge: good. Able to perform simple calculations.   Able to name object. Normal comprehension.     Cranial Nerves     CN III, IV, VI   Pupils are equal, round, and reactive to light.  Extraocular motions are normal.   Right pupil: Shape: regular. Reactivity: brisk. Consensual response: intact.   Left pupil: Shape: regular. Reactivity: brisk. Consensual response: intact.   CN III: no CN III palsy  CN VI: no CN VI palsy  Nystagmus: none   Ophthalmoparesis: none  Upgaze: normal  Downgaze: normal  Conjugate gaze: present    CN V   Facial sensation intact.     CN VII   Facial expression full, symmetric.     CN VIII   Hearing: intact    CN XI   Right trapezius strength: normal  Left trapezius strength: normal    CN XII   CN XII normal.     Motor Exam   Muscle bulk: normal  Overall muscle tone: normal  Right arm pronator drift: absent  Left arm pronator drift: absent    Strength   Strength 5/5 throughout.     Sensory Exam   Light touch normal.     Gait, Coordination, and Reflexes     Gait  Gait: normal    Coordination   Finger to nose coordination: normal    Tremor   Resting tremor: absent  Intention tremor: absent  Action tremor: absent    Reflexes   Right brachioradialis: 2+  Left brachioradialis: 2+  Right biceps: 2+  Left biceps: 2+  Right patellar: 2+  Left patellar: 2+  Right Porter: absent  Left Porter: absent        MEDICAL DECISION MAKING    Imaging Studies:     No results found.    I have personally reviewed pertinent reports.   and I have personally reviewed pertinent films in PACS

## 2024-04-10 ENCOUNTER — TELEPHONE (OUTPATIENT)
Dept: NEUROSURGERY | Facility: CLINIC | Age: 59
End: 2024-04-10

## 2024-04-10 NOTE — TELEPHONE ENCOUNTER
4/10/24 - PT CB TO PROVIDED FAX NUMBER TO [facility] TO HAVE LAB ORDERS FAXED     LABS FAXED [phone]

## 2024-04-10 NOTE — TELEPHONE ENCOUNTER
Received a call from patient questioning when she should have her labs drawn and requested they be faxed to her lab.     Attempted to return her call however she did not answer. Left detailed VM advising she may have them done at her convenience prior to her CT scan on 5/4. Requested a call back with the fax number of her lab. Provided office number.

## 2024-05-04 ENCOUNTER — HOSPITAL ENCOUNTER (OUTPATIENT)
Dept: CT IMAGING | Facility: HOSPITAL | Age: 59
Discharge: HOME | End: 2024-05-04
Payer: COMMERCIAL

## 2024-05-04 DIAGNOSIS — I72.9 ANEURYSM (HCC): ICD-10-CM

## 2024-05-04 PROCEDURE — 70496 CT ANGIOGRAPHY HEAD: CPT

## 2024-05-04 RX ADMIN — IOHEXOL 100 ML: 350 INJECTION, SOLUTION INTRAVENOUS at 10:58

## 2024-05-13 ENCOUNTER — OFFICE VISIT (OUTPATIENT)
Dept: NEUROSURGERY | Facility: CLINIC | Age: 59
End: 2024-05-13
Payer: COMMERCIAL

## 2024-05-13 VITALS
BODY MASS INDEX: 32.33 KG/M2 | OXYGEN SATURATION: 98 % | RESPIRATION RATE: 17 BRPM | TEMPERATURE: 97.9 F | WEIGHT: 206 LBS | SYSTOLIC BLOOD PRESSURE: 128 MMHG | HEART RATE: 62 BPM | DIASTOLIC BLOOD PRESSURE: 82 MMHG | HEIGHT: 67 IN

## 2024-05-13 DIAGNOSIS — I67.1 INTRACRANIAL ANEURYSM: Primary | ICD-10-CM

## 2024-05-13 DIAGNOSIS — Z82.3 FAMILY HISTORY OF SUBARACHNOID HEMORRHAGE: ICD-10-CM

## 2024-05-13 PROCEDURE — 99214 OFFICE O/P EST MOD 30 MIN: CPT | Performed by: NEUROLOGICAL SURGERY

## 2024-05-13 NOTE — PROGRESS NOTES
Patient Id: Hernan Bridges is a 59 y.o. female        Handedness: Right      Assessment/Plan:    Diagnoses and all orders for this visit:    Intracranial aneurysm  -     MRI angiogram head without contrast; Future        Discussion and summary:   1.  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  We discussed modifiable risk factors as well as the risk of hemorrhage based on ISIUA and UCAS.  Given the size of this and its location there also remains a possibility that this is truly an infundibulum, which would be normal anatomy.  Given the stability over this last year we will obtain a repeat imaging study in 1 year.  We can do this with an MRA.    2. Family history of aneurysm and subarachnoid hemorrhage  This was in her mother.  She  from the subarachnoid hemorrhage most likely.  I have suggested that her siblings and children be screened.    The family history we did discuss risks and benefits associated with formal arteriography to better differentiate the 2 diagnosis is an prognosticate the need for additional screening in the future.  She is elected to proceed with follow-up imaging alone.    Family screening letter provided.    I have spent a total time of 30 minutes on 24 in caring for this patient including Diagnostic results, Prognosis, Risks and benefits of tx options, Instructions for management, Patient and family education, Importance of tx compliance, Risk factor reductions, Impressions, Counseling / Coordination of care, Documenting in the medical record, Reviewing / ordering tests, medicine, procedures  , and Obtaining or reviewing history  .         Chief Complaint: Follow-up (With cta head)      HPI:   This is a very pleasant 59-year-old female with a past medical history of hypertension, factor V Leiden, obesity, and prior  tobacco abuse who had strokelike symptoms and had a CT performed 1 year ago.  This was concerning for possible left posterior communicating artery aneurysm versus infundibulum.  She returns to clinic today after obtaining a new CTA.  She has no other neurologic complaints.    Her past medical history is significant for DVT/PE and factor V Leiden.  She has had prior C-sections in the past.    She is not allergic to any medications.    She is .  She has 5 children.  She has 2 siblings.  She quit smoking in 2003.  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  No alcohol abuse no illicit drug use.    Family history is significant for subarachnoid hemorrhage in her mother.    Review of systems obtained by the MA reviewed and updated below.    Review of Systems   Constitutional: Negative.    HENT: Negative.     Eyes: Negative.    Respiratory:  Positive for apnea (uses cpap).    Cardiovascular: Negative.    Endocrine: Negative.    Genitourinary: Negative.    Musculoskeletal: Negative.    Neurological:  Negative for dizziness, seizures, speech difficulty, weakness, light-headedness, numbness and headaches.   Hematological:  Bruises/bleeds easily (medication).   Psychiatric/Behavioral: Negative.         Physical Exam  Vitals:    05/13/24 0823   BP: 128/82   Pulse: 62   Resp: 17   Temp: 97.9 °F (36.6 °C)   SpO2: 98%   She is well appearing.  Affect is appropriate. Body mass index is 32.26 kg/m².. She is awake alert and oriented.  Hearing and vision are grossly intact.   Her pupils are equal round reactive to light.  Her extraocular movements are intact.  Her face is symmetric.  Tongue is midline.  Facial sensation is intact and symmetric throughout.  Shoulder shrug is 5/5.  There is no drift or dysmetria.     She has full strength in her bilateral upper and lower extremities.  She has normal muscle tone muscle bulk.  Her biceps reflexes and patellar reflexes are 2+ and symmetric.  Alma Delia sign negative  bilaterally.  Sensation intact to light touch and pinprick throughout.  Her gait is normal.     Her heart rate is regular.  Normal respiratory effort.  Abdomen nondistended.  Radial pulses 2+.     The following portions of the patient's history were reviewed and updated as appropriate: allergies, current medications, past family history, past medical history, past social history, past surgical history, and problem list.    Active Ambulatory Problems     Diagnosis Date Noted    History of pulmonary embolism 2021    Shortness of breath 2021    Class 2 severe obesity due to excess calories with serious comorbidity in adult ([facility]) 2021    Occlusive thrombus 2021    H/O bariatric surgery 2021    Varicose veins of left lower extremity with pain 2021    Vertigo 2023    Dizziness     Hypertensive urgency 2023    Type 2 diabetes mellitus, without long-term current use of insulin ([facility]) 2023    Thyroid nodule 2023    Intracranial aneurysm 2024     Resolved Ambulatory Problems     Diagnosis Date Noted    No Resolved Ambulatory Problems     Past Medical History:   Diagnosis Date    DVT (deep vein thrombosis) in pregnancy        Past Surgical History:   Procedure Laterality Date    ABDOMINAL SURGERY  2023    Gastric Sleeve      SECTION           Current Outpatient Medications:     albuterol (PROVENTIL HFA,VENTOLIN HFA) 90 mcg/act inhaler, Inhale 2 puffs if needed, Disp: , Rfl:     apixaban (Eliquis) 5 mg, Take 5 mg by mouth 2 (two) times a day, Disp: , Rfl:     fexofenadine (ALLEGRA) 180 MG tablet, Take 180 mg by mouth if needed, Disp: , Rfl:     Multiple Vitamin (multivitamin) tablet, Take 1 tablet by mouth daily, Disp: , Rfl:     omeprazole (PriLOSEC) 20 mg delayed release capsule, Take 20 mg by mouth daily, Disp: , Rfl:     Ozempic, 0.25 or 0.5 MG/DOSE, 2 MG/3ML injection pen, Inject 0.5 mg under the skin every 7 days, Disp: , Rfl:     ursodiol  (ACTIGALL) 300 mg capsule, Take 300 mg by mouth 2 (two) times a day, Disp: , Rfl:     Results/Data: Imaging personally reviewed in detail with patient as well as reports. Of note, we reviewed her CTA with possible infundibulum versus aneurysm.

## 2024-06-30 ENCOUNTER — APPOINTMENT (EMERGENCY)
Dept: RADIOLOGY | Facility: HOSPITAL | Age: 59
End: 2024-06-30
Payer: COMMERCIAL

## 2024-06-30 ENCOUNTER — HOSPITAL ENCOUNTER (EMERGENCY)
Facility: HOSPITAL | Age: 59
Discharge: HOME | End: 2024-06-30
Attending: EMERGENCY MEDICINE
Payer: COMMERCIAL

## 2024-06-30 VITALS
DIASTOLIC BLOOD PRESSURE: 75 MMHG | RESPIRATION RATE: 18 BRPM | HEART RATE: 73 BPM | SYSTOLIC BLOOD PRESSURE: 153 MMHG | OXYGEN SATURATION: 99 % | TEMPERATURE: 98.1 F

## 2024-06-30 DIAGNOSIS — M25.562 BILATERAL KNEE PAIN: ICD-10-CM

## 2024-06-30 DIAGNOSIS — S93.401A RIGHT ANKLE SPRAIN: ICD-10-CM

## 2024-06-30 DIAGNOSIS — S92.352A CLOSED DISPLACED FRACTURE OF FIFTH METATARSAL BONE OF LEFT FOOT, INITIAL ENCOUNTER: ICD-10-CM
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
## 2024-06-30 DIAGNOSIS — S82.832A CLOSED FRACTURE OF DISTAL END OF LEFT FIBULA, UNSPECIFIED FRACTURE MORPHOLOGY, INITIAL ENCOUNTER: ICD-10-CM

## 2024-06-30 DIAGNOSIS — E87.6 HYPOKALEMIA: ICD-10-CM

## 2024-06-30 DIAGNOSIS — M25.561 BILATERAL KNEE PAIN: ICD-10-CM

## 2024-06-30 LAB
ANION GAP SERPL CALCULATED.3IONS-SCNC: 5 MMOL/L (ref 4–13)
BASOPHILS # BLD AUTO: 0.04 THOUSANDS/ÂΜL (ref 0–0.1)
BASOPHILS NFR BLD AUTO: 0 % (ref 0–1)
BUN SERPL-MCNC: 20 MG/DL (ref 5–25)
CALCIUM SERPL-MCNC: 9.3 MG/DL (ref 8.4–10.2)
CHLORIDE SERPL-SCNC: 107 MMOL/L (ref 96–108)
CO2 SERPL-SCNC: 29 MMOL/L (ref 21–32)
CREAT SERPL-MCNC: 0.68 MG/DL (ref 0.6–1.3)
EOSINOPHIL # BLD AUTO: 0.11 THOUSAND/ÂΜL (ref 0–0.61)
EOSINOPHIL NFR BLD AUTO: 1 % (ref 0–6)
ERYTHROCYTE [DISTWIDTH] IN BLOOD BY AUTOMATED COUNT: 14.1 % (ref 11.6–15.1)
GFR SERPL CREATININE-BSD FRML MDRD: 96 ML/MIN/1.73SQ M
GLUCOSE SERPL-MCNC: 120 MG/DL (ref 65–140)
HCT VFR BLD AUTO: 35.4 % (ref 34.8–46.1)
HGB BLD-MCNC: 11.6 G/DL (ref 11.5–15.4)
IMM GRANULOCYTES # BLD AUTO: 0.02 THOUSAND/UL (ref 0–0.2)
IMM GRANULOCYTES NFR BLD AUTO: 0 % (ref 0–2)
LYMPHOCYTES # BLD AUTO: 2.43 THOUSANDS/ÂΜL (ref 0.6–4.47)
LYMPHOCYTES NFR BLD AUTO: 26 % (ref 14–44)
MCH RBC QN AUTO: 30.1 PG (ref 26.8–34.3)
MCHC RBC AUTO-ENTMCNC: 32.8 G/DL (ref 31.4–37.4)
MCV RBC AUTO: 92 FL (ref 82–98)
MONOCYTES # BLD AUTO: 0.68 THOUSAND/ÂΜL (ref 0.17–1.22)
MONOCYTES NFR BLD AUTO: 7 % (ref 4–12)
NEUTROPHILS # BLD AUTO: 5.92 THOUSANDS/ÂΜL (ref 1.85–7.62)
NEUTS SEG NFR BLD AUTO: 66 % (ref 43–75)
NRBC BLD AUTO-RTO: 0 /100 WBCS
PLATELET # BLD AUTO: 261 THOUSANDS/UL (ref 149–390)
PMV BLD AUTO: 10.1 FL (ref 8.9–12.7)
POTASSIUM SERPL-SCNC: 3.3 MMOL/L (ref 3.5–5.3)
RBC # BLD AUTO: 3.86 MILLION/UL (ref 3.81–5.12)
SODIUM SERPL-SCNC: 141 MMOL/L (ref 135–147)
WBC # BLD AUTO: 9.2 THOUSAND/UL (ref 4.31–10.16)

## 2024-06-30 PROCEDURE — 73564 X-RAY EXAM KNEE 4 OR MORE: CPT

## 2024-06-30 PROCEDURE — 29515 APPLICATION SHORT LEG SPLINT: CPT | Performed by: PHYSICIAN ASSISTANT

## 2024-06-30 PROCEDURE — 96375 TX/PRO/DX INJ NEW DRUG ADDON: CPT

## 2024-06-30 PROCEDURE — 85025 COMPLETE CBC W/AUTO DIFF WBC: CPT | Performed by: PHYSICIAN ASSISTANT

## 2024-06-30 PROCEDURE — 73610 X-RAY EXAM OF ANKLE: CPT

## 2024-06-30 PROCEDURE — 73630 X-RAY EXAM OF FOOT: CPT

## 2024-06-30 PROCEDURE — 73502 X-RAY EXAM HIP UNI 2-3 VIEWS: CPT

## 2024-06-30 PROCEDURE — 99284 EMERGENCY DEPT VISIT MOD MDM: CPT

## 2024-06-30 PROCEDURE — 99285 EMERGENCY DEPT VISIT HI MDM: CPT | Performed by: PHYSICIAN ASSISTANT

## 2024-06-30 PROCEDURE — 80048 BASIC METABOLIC PNL TOTAL CA: CPT | Performed by: PHYSICIAN ASSISTANT

## 2024-06-30 PROCEDURE — 96365 THER/PROPH/DIAG IV INF INIT: CPT

## 2024-06-30 PROCEDURE — 36415 COLL VENOUS BLD VENIPUNCTURE: CPT | Performed by: PHYSICIAN ASSISTANT

## 2024-06-30 RX ORDER — OXYCODONE HYDROCHLORIDE 5 MG/1
5 TABLET ORAL EVERY 6 HOURS PRN
Qty: 12 TABLET | Refills: 0 | Status: SHIPPED | OUTPATIENT
Start: 2024-06-30

## 2024-06-30 RX ORDER — MORPHINE SULFATE 4 MG/ML
4 INJECTION, SOLUTION INTRAMUSCULAR; INTRAVENOUS ONCE
Status: COMPLETED | OUTPATIENT
Start: 2024-06-30 | End: 2024-06-30

## 2024-06-30 RX ORDER — POTASSIUM CHLORIDE 20 MEQ/1
40 TABLET, EXTENDED RELEASE ORAL ONCE
Status: COMPLETED | OUTPATIENT
Start: 2024-06-30 | End: 2024-06-30

## 2024-06-30 RX ORDER — ACETAMINOPHEN 500 MG
1000 TABLET ORAL EVERY 6 HOURS PRN
Qty: 30 TABLET | Refills: 0 | Status: SHIPPED | OUTPATIENT
Start: 2024-06-30

## 2024-06-30 RX ORDER — POTASSIUM CHLORIDE 750 MG/1
20 TABLET, EXTENDED RELEASE ORAL 2 TIMES DAILY
Qty: 28 TABLET | Refills: 0 | Status: SHIPPED | OUTPATIENT
Start: 2024-06-30 | End: 2024-07-07

## 2024-06-30 RX ADMIN — POTASSIUM CHLORIDE 40 MEQ: 1500 TABLET, EXTENDED RELEASE ORAL at 15:25

## 2024-06-30 RX ADMIN — MORPHINE SULFATE 4 MG: 4 INJECTION INTRAVENOUS at 14:01

## 2024-06-30 RX ADMIN — SODIUM CHLORIDE, SODIUM LACTATE, POTASSIUM CHLORIDE, AND CALCIUM CHLORIDE 500 ML: .6; .31; .03; .02 INJECTION, SOLUTION INTRAVENOUS at 14:02

## 2024-06-30 NOTE — Clinical Note
Hernan Bridges was seen and treated in our emergency department on 6/30/2024.        No work until cleared by Family Doctor/Orthopedics        Diagnosis:     Hernan  is off the rest of the shift today.    She may return on this date:          If you have any questions or concerns, please don't hesitate to call.      Sergo Londono PA-C    ______________________________           _______________          _______________  Hospital Representative                              Date                                Time

## 2024-06-30 NOTE — ED PROVIDER NOTES
History  Chief Complaint   Patient presents with    Fall     Pt reports to ED stating she tripped and fell last night, landing on knees. Denies head strike. No LOC. Takes eliquis.      The patient is a 59-year-old female on Eliquis who presents with a chief complaint of bilateral leg pain status post fall yesterday.  States that she tripped walking last evening.  States she fell directly on both knees did overturn her ankles.  Needed assistance to stand.  Today the pain continued having a difficult time walking.  Came in for evaluation after taking Tylenol without relief.  Bruising noted over the lower extremity.  Denies any head injury or loss of consciousness.          Prior to Admission Medications   Prescriptions Last Dose Informant Patient Reported? Taking?   Multiple Vitamin (multivitamin) tablet   Yes No   Sig: Take 1 tablet by mouth daily   Ozempic, 0.25 or 0.5 MG/DOSE, 2 MG/3ML injection pen   Yes No   Sig: Inject 0.5 mg under the skin every 7 days   albuterol (PROVENTIL HFA,VENTOLIN HFA) 90 mcg/act inhaler   Yes No   Sig: Inhale 2 puffs if needed   apixaban (Eliquis) 5 mg   Yes No   Sig: Take 5 mg by mouth 2 (two) times a day   fexofenadine (ALLEGRA) 180 MG tablet   Yes No   Sig: Take 180 mg by mouth if needed   omeprazole (PriLOSEC) 20 mg delayed release capsule   Yes No   Sig: Take 20 mg by mouth daily   ursodiol (ACTIGALL) 300 mg capsule   Yes No   Sig: Take 300 mg by mouth 2 (two) times a day      Facility-Administered Medications: None       Past Medical History:   Diagnosis Date    DVT (deep vein thrombosis) in pregnancy     Intracranial aneurysm 2024    Pulmonary embolism (HCC)        Past Surgical History:   Procedure Laterality Date    ABDOMINAL SURGERY  2023    Gastric Sleeve      SECTION         History reviewed. No pertinent family history.  I have reviewed and agree with the history as documented.    E-Cigarette/Vaping    E-Cigarette Use Never User      E-Cigarette/Vaping  Substances     Social History     Tobacco Use    Smoking status: Former     Current packs/day: 0.00     Types: Cigarettes     Quit date:      Years since quittin.5    Smokeless tobacco: Never   Vaping Use    Vaping status: Never Used   Substance Use Topics    Alcohol use: Never    Drug use: Never       Review of Systems   All other systems reviewed and are negative.      Physical Exam  Physical Exam  Vitals and nursing note reviewed.   Constitutional:       General: She is in acute distress.      Appearance: She is well-developed.   HENT:      Head: Normocephalic and atraumatic.   Eyes:      Extraocular Movements: Extraocular movements intact.      Pupils: Pupils are equal, round, and reactive to light.   Cardiovascular:      Rate and Rhythm: Normal rate and regular rhythm.   Pulmonary:      Effort: Pulmonary effort is normal.      Breath sounds: Normal breath sounds.   Musculoskeletal:      Cervical back: Normal range of motion and neck supple.      Right hip: Tenderness present.      Right upper leg: Normal.      Left upper leg: Normal.      Right knee: Tenderness present over the medial joint line and lateral joint line.      Left knee: Tenderness present over the medial joint line and lateral joint line.      Right ankle: Tenderness present over the lateral malleolus.      Right foot: Normal.      Left foot: Tenderness and bony tenderness present.        Feet:    Skin:     General: Skin is warm and dry.      Capillary Refill: Capillary refill takes less than 2 seconds.   Neurological:      Mental Status: She is alert and oriented to person, place, and time.      Coordination: Coordination normal.   Psychiatric:         Behavior: Behavior normal.         Vital Signs  ED Triage Vitals   Temperature Pulse Respirations Blood Pressure SpO2   24 1318 24 1318 24 1318 24 1318 24 1318   98.1 °F (36.7 °C) 73 18 153/75 99 %      Temp Source Heart Rate Source Patient Position -  Orthostatic VS BP Location FiO2 (%)   06/30/24 1318 06/30/24 1318 06/30/24 1318 06/30/24 1318 --   Temporal Monitor Sitting Left arm       Pain Score       06/30/24 1401       9           Vitals:    06/30/24 1318   BP: 153/75   Pulse: 73   Patient Position - Orthostatic VS: Sitting         Visual Acuity      ED Medications  Medications   morphine injection 4 mg (4 mg Intravenous Given 6/30/24 1401)   lactated ringers bolus 500 mL (0 mL Intravenous Stopped 6/30/24 1502)   potassium chloride (Klor-Con M20) CR tablet 40 mEq (40 mEq Oral Given 6/30/24 1525)       Diagnostic Studies  Results Reviewed       Procedure Component Value Units Date/Time    Basic metabolic panel [[id number]]  (Abnormal) Collected: 06/30/24 1404    Lab Status: Final result Specimen: Blood from Arm, Right Updated: 06/30/24 1427     Sodium 141 mmol/L      Potassium 3.3 mmol/L      Chloride 107 mmol/L      CO2 29 mmol/L      ANION GAP 5 mmol/L      BUN 20 mg/dL      Creatinine 0.68 mg/dL      Glucose 120 mg/dL      Calcium 9.3 mg/dL      eGFR 96 ml/min/1.73sq m     Narrative:      National Kid[employer] Disease Foundation guidelines for Chronic Kidney Disease (CKD):     Stage 1 with normal or high GFR (GFR > 90 mL/min/1.73 square meters)    Stage 2 Mild CKD (GFR = 60-89 mL/min/1.73 square meters)    Stage 3A Moderate CKD (GFR = 45-59 mL/min/1.73 square meters)    Stage 3B Moderate CKD (GFR = 30-44 mL/min/1.73 square meters)    Stage 4 Severe CKD (GFR = 15-29 mL/min/1.73 square meters)    Stage 5 End Stage CKD (GFR <15 mL/min/1.73 square meters)  Note: GFR calculation is accurate only with a steady state creatinine    CBC and differential [[id number]] Collected: 06/30/24 1404    Lab Status: Final result Specimen: Blood from Arm, Right Updated: 06/30/24 1410     WBC 9.20 Thousand/uL      RBC 3.86 Million/uL      Hemoglobin 11.6 g/dL      Hematocrit 35.4 %      MCV 92 fL      MCH 30.1 pg      MCHC 32.8 g/dL      RDW 14.1 %      MPV 10.1 fL      Platelets 261  Thousands/uL      nRBC 0 /100 WBCs      Segmented % 66 %      Immature Grans % 0 %      Lymphocytes % 26 %      Monocytes % 7 %      Eosinophils Relative 1 %      Basophils Relative 0 %      Absolute Neutrophils 5.92 Thousands/µL      Absolute Immature Grans 0.02 Thousand/uL      Absolute Lymphocytes 2.43 Thousands/µL      Absolute Monocytes 0.68 Thousand/µL      Eosinophils Absolute 0.11 Thousand/µL      Basophils Absolute 0.04 Thousands/µL                    XR hip/pelv 2-3 vws right if performed   ED Interpretation by Sergo Londono PA-C (06/30 1515)   No acute fracture      XR knee 4+ vw right injury   ED Interpretation by Sergo Londono PA-C (06/30 1515)   Osteophytes no acute fracture      XR knee 4+ vw left injury   ED Interpretation by Sergo Londono PA-C (06/30 1515)   Osteophytes no acute fracture      XR ankle 3+ views RIGHT   ED Interpretation by Sergo Londono PA-C (06/30 1515)   No acute fracture      XR ankle 3+ views LEFT   ED Interpretation by Sergo Londono PA-C (06/30 1515)   Distal fibula fracture      XR foot 3+ vw left   ED Interpretation by Sergo Londono PA-C (06/30 1514)   Fifth metatarsal fracture                 Procedures  Splint application    Date/Time: 6/30/2024 4:28 PM    Performed by: Sergo Londono PA-C  Authorized by: Sergo Londono PA-C  Universal Protocol:  Procedure performed by: (linnea wall)  Consent: Verbal consent obtained.  Risks and benefits: risks, benefits and alternatives were discussed  Consent given by: patient    Procedure details:     Laterality:  Left    Location:  Leg    Leg:  L lower leg    Strapping: no      Splint type:  Short leg    Supplies:  Cotton padding, Ortho-Glass and 2 layer wrap  Post-procedure details:     Pain:  Improved    Sensation:  Normal    Patient tolerance of procedure:  Tolerated well, no immediate complications           ED Course                               SBIRT 22yo+      Flowsheet Row  Most Recent Value   Initial Alcohol Screen: US AUDIT-C     1. How often do you have a drink containing alcohol? 0 Filed at: 06/30/2024 1318   2. How many drinks containing alcohol do you have on a typical day you are drinking?  0 Filed at: 06/30/2024 1318   3b. FEMALE Any Age, or MALE 65+: How often do you have 4 or more drinks on one occassion? 0 Filed at: 06/30/2024 1318   Audit-C Score 0 Filed at: 06/30/2024 1318   OLAYINKA: How many times in the past year have you...    Used an illegal drug or used a prescription medication for non-medical reasons? Never Filed at: 06/30/2024 1318                      Medical Decision Making  The patient is a 59-year-old female on Eliis who presents with a chief complaint of bilateral leg pain status post fall yesterday.  States that she tripped walking last evening.  States she fell directly on both knees did overturn her ankles.  Needed assistance to stand.  Today the pain continued having a difficult time walking.  Came in for evaluation after taking Tylenol without relief.  Bruising noted over the lower extremity.  Denies any head injury or loss of consciousness.    Found to have left metatarsal fracture, left ankle fracture.  Given a right ankle brace for sprain.  It did give pain control and walker.  Referred to orthopedics.  Patient agreement treatment plan    Amount and/or Complexity of Data Reviewed  Labs: ordered. Decision-making details documented in ED Course.  Radiology: ordered and independent interpretation performed. Decision-making details documented in ED Course.    Risk  OTC drugs.  Prescription drug management.             Disposition  Final diagnoses:   Fall, initial encounter   Closed displaced fracture of fifth metatarsal bone of left foot, initial encounter   Closed fracture of distal end of left fibula, unspecified fracture morphology, initial encounter   Right ankle sprain   Bilateral knee pain   Hypokalemia     Time reflects when diagnosis was documented  in both MDM as applicable and the Disposition within this note       Time User Action Codes Description Comment    6/30/2024  3:15 PM Sergo Londono Add [W19.XXXA] Fall, initial encounter     6/30/2024  3:15 PM Sergo Londono Add [S92.352A] Closed displaced fracture of fifth metatarsal bone of left foot, initial encounter     6/30/2024  3:16 PM Spring Trotter Add [S82.832A] Closed fracture of distal end of left fibula, unspecified fracture morphology, initial encounter     6/30/2024  3:16 PM Spring Trotter Add [S93.401A] Right ankle sprain     6/30/2024  3:16 PM Spring Trotter Add [M25.561,  M25.562] Bilateral knee pain     6/30/2024  3:16 PM Sergo Londono Add [E87.6] Hypokalemia           ED Disposition       ED Disposition   Discharge    Condition   Stable    Date/Time   Sun Jun 30, 2024 1516    Comment   Hernan Bridges discharge to home/self care.                   Follow-up Information       Follow up With Specialties Details Why Contact [facility] Schedule an appointment as soon as possible for a visit  If symptoms worsen [address] [facility] [address] [address]  [facility] [id number] [phone]              Discharge Medication List as of 6/30/2024  3:20 PM        START taking these medications    Details   acetaminophen (TYLENOL) 500 mg tablet Take 2 tablets (1,000 mg total) by mouth every 6 (six) hours as needed for mild pain or moderate pain, Starting Sun 6/30/2024, Normal      oxyCODONE (Roxicodone) 5 immediate release tablet Take 1 tablet (5 mg total) by mouth every 6 (six) hours as needed for severe pain Max Daily Amount: 20 mg, Starting Sun 6/30/2024, Normal      potassium chloride (Klor-Con M10) 10 mEq tablet Take 2 tablets (20 mEq total) by mouth 2 (two) times a day for 7 days, Starting Sun 6/30/2024, Until Sun 7/7/2024, Normal           CONTINUE these medications which have NOT CHANGED    Details   albuterol (PROVENTIL HFA,VENTOLIN HFA) 90 mcg/act inhaler Inhale 2 puffs if needed,  Historical Med      apixaban (Eliquis) 5 mg Take 5 mg by mouth 2 (two) times a day, Historical Med      fexofenadine (ALLEGRA) 180 MG tablet Take 180 mg by mouth if needed, Historical Med      Multiple Vitamin (multivitamin) tablet Take 1 tablet by mouth daily, Historical Med      omeprazole (PriLOSEC) 20 mg delayed release capsule Take 20 mg by mouth daily, Starting Sat 3/2/2024, Historical Med      Ozempic, 0.25 or 0.5 MG/DOSE, 2 MG/3ML injection pen Inject 0.5 mg under the skin every 7 days, Historical Med      ursodiol (ACTIGALL) 300 mg capsule Take 300 mg by mouth 2 (two) times a day, Historical Med                 PDMP Review         Value Time User    PDMP Reviewed  Yes 3/3/2021 12:32 PM Linda Torres MD            ED Provider  Electronically Signed by             Sergo Londono PA-C  06/30/24 [id number]

## 2024-07-01 RX ORDER — ACETAMINOPHEN 325 MG/1
325 TABLET ORAL DAILY
COMMUNITY

## 2024-07-02 ENCOUNTER — OFFICE VISIT (OUTPATIENT)
Dept: OBGYN CLINIC | Facility: CLINIC | Age: 59
End: 2024-07-02
Payer: COMMERCIAL

## 2024-07-02 VITALS
HEIGHT: 67 IN | WEIGHT: 206 LBS | BODY MASS INDEX: 32.33 KG/M2 | OXYGEN SATURATION: 97 % | TEMPERATURE: 98.2 F | DIASTOLIC BLOOD PRESSURE: 80 MMHG | HEART RATE: 72 BPM | SYSTOLIC BLOOD PRESSURE: 120 MMHG

## 2024-07-02 DIAGNOSIS — S92.352A CLOSED DISPLACED FRACTURE OF FIFTH METATARSAL BONE OF LEFT FOOT, INITIAL ENCOUNTER: Primary | ICD-10-CM

## 2024-07-02 DIAGNOSIS — M17.0 PRIMARY OSTEOARTHRITIS OF BOTH KNEES: ICD-10-CM

## 2024-07-02 DIAGNOSIS — M79.672 LEFT FOOT PAIN: ICD-10-CM

## 2024-07-02 DIAGNOSIS — M25.571 ACUTE RIGHT ANKLE PAIN: ICD-10-CM

## 2024-07-02 DIAGNOSIS — S80.00XA CONTUSION OF KNEE, UNSPECIFIED LATERALITY, INITIAL ENCOUNTER: ICD-10-CM

## 2024-07-02 DIAGNOSIS — S93.401A SPRAIN OF UNSPECIFIED LIGAMENT OF RIGHT ANKLE, INITIAL ENCOUNTER: ICD-10-CM

## 2024-07-02 PROCEDURE — 99204 OFFICE O/P NEW MOD 45 MIN: CPT | Performed by: STUDENT IN AN ORGANIZED HEALTH CARE EDUCATION/TRAINING PROGRAM

## 2024-07-02 NOTE — LETTER
July 2, 2024     Patient: Hernan Bridges  YOB: 1965  Date of Visit: 7/2/2024      To Whom it May Concern:    Hernan Bridges is under my professional care. Hernan was seen in my office on 7/2/2024. Restricted from work duties at this time. Planned follow up in 2 weeks for re-evaluation.     If you have any questions or concerns, please don't hesitate to call.         Sincerely,          Ty Wilcox MD        CC: No Recipients

## 2024-07-02 NOTE — LETTER
July 2, 2024     Patient: Hernan Bridges  YOB: 1965  Date of Visit: 7/2/2024      To Whom it May Concern:    Hernan Bridges is under my professional care. Hernan was seen in my office on 7/2/2024. Restricted to sedentary duties at this time. Restricted from climbing stairs/ladders. Restricted to wearing CAM boot on left lower extremity and right ankle brace. Allow use of walker for mobility/stability. Restrictions in place until cleared by a provider. Planned follow up in 2 weeks for re-evaluation.     If you have any questions or concerns, please don't hesitate to call.         Sincerely,          Ty Wilcox MD        CC: No Recipients

## 2024-07-02 NOTE — PROGRESS NOTES
1. Closed displaced fracture of fifth metatarsal bone of left foot, initial encounter  Ambulatory referral to Orthopedic Surgery    Cam Boot      2. Sprain of unspecified ligament of right ankle, initial encounter  Ambulatory referral to Orthopedic Surgery    Ankle Cude ankle/Ankle Brace      3. Contusion of knee, unspecified laterality, initial encounter        4. Primary osteoarthritis of both knees        5. Acute right ankle pain        6. Left foot pain          Orders Placed This Encounter   Procedures    Ankle Cude ankle/Ankle Brace    Cam Boot        Imaging Studies (I personally reviewed images in PACS and report):    X-ray left ankle 6/30/2024: Osseous density adjacent to the distal tip of the fibula which may be degenerative or secondary to an age-indeterminate avulsion fracture.  This was recommended to correlate with point tenderness. (Non-tender clinically over distal fibula on exam). Scattered soft tissue calcifications  X-ray left foot 6/30/2024: Mildly displaced oblique fracture of the left fifth metatarsal. Calcaneal enthesophyte. OA of first MTP.  X-ray right ankle 6/30/2024: No acute osseous abnormalities. Calcaneal enthesophyte. Soft tissue calcifications that appear vascular.  X-ray bilateral knees 6/30/2024: Severe tricompartmental osteoarthritis most prominent along the patellofemoral joint line.  Other evidence including marginal osteophytes and subchondral sclerosis and cysts.  No joint effusion.  No acute osseous abnormality.  X-ray right hip 6/30/2024: No acute osseous abnormalities.  Moderate degenerative changes of the right hip and mild left hip osteoarthritic changes.  Degenerative changes of the lower lumbar spine noted.      IMPRESSION:  Acute left foot/ankle pain - mildly displaced 5th metatarsal shaft frx  Acute right lateral ankle pain - secondary to twisting injury/lateral ankle sprain  Bilateral anterior knee pain secondary to contusion injury.  Radiographs note  degenerative/osteoarthritic changes as well.  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 I have discussed with the patient the pathophysiology of this diagnosis and reviewed how the examination correlates with this diagnosis.    Prior imaging reviewed as above.   In regards to left fifth proximal metatarsal fracture, I recommended conservative treatment at this time.  Transitioned out of short leg splint - prescribed and placed low tide Cam boot and counseled weightbearing as tolerated in the cam boot.  In regards to her right ankle injury consistent with lateral ankle sprain patient is to continue use of her ankle brace while weightbearing as tolerated.  In regards to bilateral lower extremity injuries, patient already has a walker today and I recommended use of the walker to help facilitate mobility given her overall sense of stability while walking.  In regards to her bilateral knee pain, I counseled/reassured patient that radiographic imaging was unremarkable other than her significant osteoarthritis of her knees.  Counseled in the future we could consider an intra-articular cortisone injection of her knee if this does not progressively improve.  Work accommodations were provided today and I will hold her off of work for the next 2 weeks to help facilitate some degree of recovery.  Note provided today as per communications.  In regards to pain control I counseled use of Tylenol, topical NSAIDs, icing/heat therapy 20 minutes on/off, elevation of the affected extremities for now.  Recommended against oral NSAIDs given her history of gastric surgery recently.    Return in about 2 weeks (around 7/16/2024).    Portions of the record  "may have been created with voice recognition software. Occasional wrong word or \"sound a like\" substitutions may have occurred due to the inherent limitations of voice recognition software. Read the chart carefully and recognize, using context, where substitutions have occurred.     CHIEF COMPLAINT:  Chief Complaint   Patient presents with    Right Ankle - Pain    Left 5th Toe - Fracture    Left Foot - Fracture         HPI:  Hernan Bridges is a 59 y.o. 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  In addition, she takes Eliquis daily in regards to history of DVTs/PEs  Sustained injury on 6/30/2024  Reports tripping while walking, twisting her b/l ankles and landing/impacting her anterior knees.  Subsequently went to ER as she had difficulty weightbearing/standing.  ER note reviewed.  Several imaging studies were done as noted above.  She is found to have a fracture of her left foot and possibly her left ankle  Left lower leg was placed in a short leg splint. Right ankle placed in brace. Prescribed walker for mobility. Prescribed tylenol, oxycodone.  Reports today that pain of left foot mainly over lateral aspect and non-radiating. Improved with use of splint d/t immobilization but has been weightbearing with use of rollator briefly.   Pain of right lower extremity localized to lateral ankle and has improved with use of lace up ankle brace.  Describes pain as sharp/aching. Non-radiating. Denies N/T of lower extremities.  Reports anterior knee soreness as well from fall but otherwise reports intact ROM/function. Reports crepitus of knees with motion but this is chronic issue.  In regards to pain control, patient has been using Tylenol as well as applying ice to her ankles " "and feet all of which provide some relief.  She avoids oral NSAIDs given her recent gastric sleeve surgery and is also chronically on Xarelto for h/o DVTs/PEs.  She has been elevating her lower extremities at night.    She is requesting work accommodations note today given her overall injuries and difficulty with weight bearing.          Medical, Surgical, Family, and Social History    Past Medical History:   Diagnosis Date    DVT (deep vein thrombosis) in pregnancy     Intracranial aneurysm 2024    Pulmonary embolism (HCC)      Past Surgical History:   Procedure Laterality Date    ABDOMINAL SURGERY  2023    Gastric Sleeve      SECTION       Social History   Social History     Substance and Sexual Activity   Alcohol Use Never     Social History     Substance and Sexual Activity   Drug Use Never     Social History     Tobacco Use   Smoking Status Former    Current packs/day: 0.00    Types: Cigarettes    Quit date:     Years since quittin.5   Smokeless Tobacco Never     History reviewed. No pertinent family history.  Allergies   Allergen Reactions    Medical Tape Rash          Physical Exam  /80 (BP Location: Left arm)   Pulse 72   Temp 98.2 °F (36.8 °C)   Ht 5' 7\" (1.702 m)   Wt 93.4 kg (206 lb)   SpO2 97%   BMI 32.26 kg/m²     Constitutional:  see vital signs  Gen: well-developed, normocephalic/atraumatic, well-groomed  Eyes: No inflammation or discharge of conjunctiva or lids; sclera clear   Pulmonary/Chest: Effort normal. No respiratory distress.     Ortho Exam   Ankle Examination (focused):     Gait: Using clinical wheelchair today. LLE in short leg splint which was removed. She also is using a right ankle stirrup. She is also using rollator walker for mobility.      RIGHT LEFT   Inspection Erythema none none    Edema 1+ 1+    Ecchymosis none +lateral foot         ROM:  Plantarflexion 20 50    Dorsiflexion 20 20         Strength Pronation 4/5 4/5    Supination 5/5 5/5    Foot " plantarflexion 5/5 4/5    Foot dorsflexion 5/5 5/5         TTP AiTFL no no    ATFL + no    CFL + no    PTFL no no    Achilles no no    Deltoid no no    Peroneal no no    Tib Ant no no    Tib Post no no         TTP (Bony) Prox Fibula no no    Lat Malleolus + no    5th MT no +    Med Malleolus no no    Navicular no no    Talar Dome no no         Anterior Drawer ATFL positive negative   Calcaneal Squeeze  negative negative   Tib-Fib Squeeze Test  negative negative   Talar Tilt (stab tib,DF foot,invert foot) CFL positive negative   Eversion stress (stab tib, cale foot) deltoid negative negative   MT Compression  negative positive         No calf tenderness to palpation bilaterally    LE NV Exam: +2 DP/PT pulses bilaterally  Sensation intact to light touch       Bilateral Knee Exam:  Erythema: no  Swelling: no  Increased Warmth: no  Tenderness: +anterior aspects of knees, +peripatellar  ROM: 0-110  Knee flexion strength: 5/5  Knee extension strength: 5/5  Patellar Grind: +  Varus laxity: negative  Valgus laxity: negative      Procedures

## 2024-07-08 ENCOUNTER — TELEPHONE (OUTPATIENT)
Age: 59
End: 2024-07-08

## 2024-07-08 DIAGNOSIS — M25.571 ACUTE RIGHT ANKLE PAIN: ICD-10-CM

## 2024-07-08 DIAGNOSIS — M79.672 LEFT FOOT PAIN: ICD-10-CM

## 2024-07-08 DIAGNOSIS — S80.00XA CONTUSION OF KNEE, UNSPECIFIED LATERALITY, INITIAL ENCOUNTER: ICD-10-CM

## 2024-07-08 DIAGNOSIS — M17.0 PRIMARY OSTEOARTHRITIS OF BOTH KNEES: ICD-10-CM

## 2024-07-08 DIAGNOSIS — S92.352A CLOSED DISPLACED FRACTURE OF FIFTH METATARSAL BONE OF LEFT FOOT, INITIAL ENCOUNTER: ICD-10-CM

## 2024-07-08 DIAGNOSIS — S93.401A SPRAIN OF UNSPECIFIED LIGAMENT OF RIGHT ANKLE, INITIAL ENCOUNTER: Primary | ICD-10-CM

## 2024-07-08 RX ORDER — CYCLOBENZAPRINE HCL 5 MG
5 TABLET ORAL 2 TIMES DAILY PRN
Qty: 60 TABLET | Refills: 1 | Status: SHIPPED | OUTPATIENT
Start: 2024-07-08

## 2024-07-08 NOTE — TELEPHONE ENCOUNTER
Caller: Patient     Doctor: Dr. Wilcox     Reason for call: Patient calling asking if a muscle relaxer would help with her pain in the posterior aspect of her right knee?  She has FMLA paperwork to be filled out and the return to work date does not coincide with her f/u appt of 7/31/24.  Patient is asking to speak to the clincal team.    Call back#:

## 2024-07-09 NOTE — TELEPHONE ENCOUNTER
Called and spoke with patient. Made patient aware of Dr. Wilcox's recommendations and FMLA update.

## 2024-07-12 ENCOUNTER — TELEPHONE (OUTPATIENT)
Dept: OBGYN CLINIC | Facility: HOSPITAL | Age: 59
End: 2024-07-12

## 2024-07-15 ENCOUNTER — TELEPHONE (OUTPATIENT)
Dept: OBGYN CLINIC | Facility: CLINIC | Age: 59
End: 2024-07-15

## 2024-07-15 NOTE — TELEPHONE ENCOUNTER
Faxed patient's FMLA paperwork to patient's Human Resources department per patient request. Patient did state that family member was going to  papers on Friday 7/12.     Faxed and received confirmation.     Faxed to [phone]

## 2024-07-15 NOTE — TELEPHONE ENCOUNTER
Caller: Self    Doctor: Anand    Reason for call: Employer did not receive forms. Patient states she gave  and index card with fax info. Successfully faxed completed forms to Amanda [employer]alaver[employer] at  during this encounter    Call back#: [phone]

## 2024-07-23 NOTE — TELEPHONE ENCOUNTER
Caller: Self    Doctor: Brenden    Reason for call: Patient's son dropped on [employer] paperwork last week, checking to see if they were ready. Paperwork scanned in on 7/18, advised someone would call once ready    Call back#: [phone]

## 2024-07-26 ENCOUNTER — TELEPHONE (OUTPATIENT)
Dept: PAIN MEDICINE | Facility: CLINIC | Age: 59
End: 2024-07-26

## 2024-07-31 ENCOUNTER — HOSPITAL ENCOUNTER (OUTPATIENT)
Dept: RADIOLOGY | Facility: CLINIC | Age: 59
Discharge: HOME | End: 2024-07-31
Payer: COMMERCIAL

## 2024-07-31 ENCOUNTER — OFFICE VISIT (OUTPATIENT)
Dept: OBGYN CLINIC | Facility: CLINIC | Age: 59
End: 2024-07-31
Payer: COMMERCIAL

## 2024-07-31 VITALS
BODY MASS INDEX: 32.33 KG/M2 | HEIGHT: 67 IN | DIASTOLIC BLOOD PRESSURE: 80 MMHG | OXYGEN SATURATION: 98 % | WEIGHT: 206 LBS | HEART RATE: 67 BPM | TEMPERATURE: 98.1 F | RESPIRATION RATE: 18 BRPM | SYSTOLIC BLOOD PRESSURE: 128 MMHG

## 2024-07-31 DIAGNOSIS — S92.352D CLOSED DISPLACED FRACTURE OF FIFTH METATARSAL BONE OF LEFT FOOT WITH ROUTINE HEALING, SUBSEQUENT ENCOUNTER: Primary | ICD-10-CM

## 2024-07-31 DIAGNOSIS — S93.401D SPRAIN OF LIGAMENT OF RIGHT ANKLE, SUBSEQUENT ENCOUNTER: ICD-10-CM

## 2024-07-31 DIAGNOSIS — S80.00XD CONTUSION OF KNEE, UNSPECIFIED LATERALITY, SUBSEQUENT ENCOUNTER: ICD-10-CM

## 2024-07-31 DIAGNOSIS — S92.352D CLOSED DISPLACED FRACTURE OF FIFTH METATARSAL BONE OF LEFT FOOT WITH ROUTINE HEALING, SUBSEQUENT ENCOUNTER: ICD-10-CM

## 2024-07-31 PROCEDURE — 73630 X-RAY EXAM OF FOOT: CPT

## 2024-07-31 PROCEDURE — 99213 OFFICE O/P EST LOW 20 MIN: CPT | Performed by: STUDENT IN AN ORGANIZED HEALTH CARE EDUCATION/TRAINING PROGRAM

## 2024-07-31 NOTE — PROGRESS NOTES
1. Closed displaced fracture of fifth metatarsal bone of left foot with routine healing, subsequent encounter  XR foot 3+ vw left    Post Op Shoe      2. Sprain of ligament of right ankle, subsequent encounter        3. Contusion of knee, unspecified laterality, subsequent encounter          Orders Placed This Encounter   Procedures    Post Op Shoe    XR foot 3+ vw left        Imaging Studies (I personally reviewed images in PACS and report):    X-ray left ankle 6/30/2024: Osseous density adjacent to the distal tip of the fibula which may be degenerative or secondary to an age-indeterminate avulsion fracture.  This was recommended to correlate with point tenderness. (Non-tender clinically over distal fibula on exam). Scattered soft tissue calcifications  X-ray left foot 6/30/2024: Mildly displaced oblique fracture of the left fifth metatarsal. Calcaneal enthesophyte. OA of first MTP.  X-ray right ankle 6/30/2024: No acute osseous abnormalities. Calcaneal enthesophyte. Soft tissue calcifications that appear vascular.  X-ray bilateral knees 6/30/2024: Severe tricompartmental osteoarthritis most prominent along the patellofemoral joint line.  Other evidence including marginal osteophytes and subchondral sclerosis and cysts.  No joint effusion.  No acute osseous abnormality.  X-ray right hip 6/30/2024: No acute osseous abnormalities.  Moderate degenerative changes of the right hip and mild left hip osteoarthritic changes.  Degenerative changes of the lower lumbar spine noted.      IMPRESSION:  Acute left foot/ankle pain - mildly displaced 5th metatarsal shaft frx -Clinically improving as patient non-tender over fracture- radiographs note healing changes as well  Acute right lateral ankle pain - secondary to twisting injury/lateral ankle sprain - improved  Bilateral anterior knee pain secondary to contusion injury.  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 I have discussed with the patient the pathophysiology of this diagnosis and reviewed how the examination correlates with this diagnosis.  Repeated imaging of her left foot today as noted above.  I will follow-up official radiology interpretation.  Reassured patient in regards to her clinical exam and radiographic imaging.  Counseled she can transition out of the cam boot to a postop shoe today over the next 2 weeks.  Postop shoe prescribed.  Counseled weightbearing as tolerated in the postop shoe.  After 2 weeks, she can transition to regular footwear as tolerated.  In regards to her right ankle, she can progressively transition out of her ankle brace as well weight-bear as tolerated on her right lower extremity.  Updated her work accommodations  for the next 2 weeks before she is allowed to return without need for an postop shoe on her left foot.  In regards to pain control I counseled use of Tylenol, topical NSAIDs, icing/heat therapy 20 minutes on/off, elevation of the affected extremities for now.  Recommended against oral NSAIDs given her history of gastric surgery recently.    Return if symptoms worsen or fail to improve.    Portions of the record may have been created with voice recognition software. Occasional wrong word or \"sound a like\" substitutions may have occurred due to the inherent limitations of voice recognition software. Read the chart carefully and recognize, using context, where substitutions have occurred.     CHIEF COMPLAINT:  Chief Complaint   Patient presents with    Left Foot - Follow-up         HPI:  Hernan Bridges is a 59 y.o. female  who presents with her son for "     Visit 7/31/2024:  Follow up left foot 5th metatarsal fracture, right ankle sprain, b/l knee contusions:  On last visit 7/2/2024 she was transitioned to a CAM boot of her LLE.  Prior pertinent PMHx as per below.  There has been a delay to follow up as patient was expected around 7/16/2024 to repeat imaging of her left foot fracture.  Patient reports today that overall she has been improving and notes minimal to no discomfort of left foot while weightbearing in CAM boot. Patient reports she actually has been wearing the CAM boot while sleeping as she was worried about waking at night to use the restroom without the boot. Denies new injuries since last visit. Denies N/T of LLE.    In regards to her right lateral ankle sprain; reports improvement as well.  She states she intermittently uses the brace but mainly while driving.  She states she has been weightbearing on her right lower extremity without the brace and does not feel any sense of instability knee pain.     Regards to pain control, she states she had tried oxycodone as well as the tramadol prescribed previously but felt due to the constipation she had stopped after few days.  Otherwise, she has not been taking pain medications over the past few weeks due to the improvement.      Visit 7/2/2024 :  Initial evaluation of left ankle/foot injury, fracture  Of note, patient underwent laparoscopic biliopancreatic diversion with duodenal switch surgery with robotic assistance (gastric sleeve surgery) on 12/18/2023 for weight loss.  In addition, she takes Eliquis daily in regards to history of DVTs/PEs  Sustained injury on 6/30/2024  Reports tripping while walking, twisting her b/l ankles and landing/impacting her anterior knees.  Subsequently went to ER as she had difficulty weightbearing/standing.  ER note reviewed.  Several imaging studies were done as noted above.  She is found to have a fracture of her left foot and possibly her left ankle  Left lower leg was  placed in a short leg splint. Right ankle placed in brace. Prescribed walker for mobility. Prescribed tylenol, oxycodone.  Reports today that pain of left foot mainly over lateral aspect and non-radiating. Improved with use of splint d/t immobilization but has been weightbearing with use of rollator briefly.   Pain of right lower extremity localized to lateral ankle and has improved with use of lace up ankle brace.  Describes pain as sharp/aching. Non-radiating. Denies N/T of lower extremities.  Reports anterior knee soreness as well from fall but otherwise reports intact ROM/function. Reports crepitus of knees with motion but this is chronic issue.  In regards to pain control, patient has been using Tylenol as well as applying ice to her ankles and feet all of which provide some relief.  She avoids oral NSAIDs given her recent gastric sleeve surgery and is also chronically on Xarelto for h/o DVTs/PEs.  She has been elevating her lower extremities at night.    She is requesting work accommodations note today given her overall injuries and difficulty with weight bearing.          Medical, Surgical, Family, and Social History    Past Medical History:   Diagnosis Date    DVT (deep vein thrombosis) in pregnancy     Intracranial aneurysm 2024    Pulmonary embolism (HCC)      Past Surgical History:   Procedure Laterality Date    ABDOMINAL SURGERY  2023    Gastric Sleeve      SECTION       Social History   Social History     Substance and Sexual Activity   Alcohol Use Never     Social History     Substance and Sexual Activity   Drug Use Never     Social History     Tobacco Use   Smoking Status Former    Current packs/day: 0.00    Types: Cigarettes    Quit date:     Years since quittin.5   Smokeless Tobacco Never     History reviewed. No pertinent family history.  Allergies   Allergen Reactions    Medical Tape Rash          Physical Exam  /80 (BP Location: Left arm, Patient Position: Sitting,  "Cuff Size: Adult)   Pulse 67   Temp 98.1 °F (36.7 °C)   Resp 18   Ht 5' 7\" (1.702 m)   Wt 93.4 kg (206 lb)   SpO2 98%   BMI 32.26 kg/m²     Constitutional:  see vital signs  Gen: well-developed, normocephalic/atraumatic, well-groomed  Eyes: No inflammation or discharge of conjunctiva or lids; sclera clear   Pulmonary/Chest: Effort normal. No respiratory distress.     Ortho Exam   Ankle Examination (focused):     Gait: Weightbearing as tolerated in a low tide cam boot for left lower extremity and right ankle brace with cane.  At the end of her clinic visit patient was able to weight-bear in a postop shoe for her left foot and without an ankle brace of her right lower extremity.      RIGHT LEFT   Inspection Erythema none none    Edema none none    Ecchymosis none +lateral foot         ROM:  Plantarflexion 40 40    Dorsiflexion 20 20         Strength Pronation 4+/5 4+/5    Supination 5-/5 5-/5    Foot plantarflexion 5/5 5-/5    Foot dorsflexion 5/5 5/5         TTP AiTFL no no    ATFL +mild no    CFL no no    PTFL no no    Achilles no no    Deltoid no no    Peroneal no no    Tib Ant no no    Tib Post no no         TTP (Bony) Prox Fibula no no    Lat Malleolus no no    5th MT no no    Med Malleolus no no    Navicular no no    Talar Dome no no         Anterior Drawer ATFL positive negative   Calcaneal Squeeze  negative negative   Tib-Fib Squeeze Test  negative negative   Talar Tilt (stab tib,DF foot,invert foot) CFL positive negative   Eversion stress (stab tib, cale foot) deltoid negative negative   MT Compression  negative negative         No calf tenderness to palpation bilaterally    LE NV Exam: +2 DP/PT pulses bilaterally  Sensation intact to light touch         Procedures        "

## 2024-07-31 NOTE — LETTER
July 31, 2024     Patient: Hernan Bridges  YOB: 1965  Date of Visit: 7/31/2024      To Whom it May Concern:    Hernan Bridges is under my professional care. Hernan was seen in my office on 7/31/2024. Patient required to either wear a post-op shoe or CAM boot her left foot while weightbearing for the next 2 weeks. On 8/14/2024, can return to wearing regular footwear for left foot without restrictions.    If you have any questions or concerns, please don't hesitate to call.         Sincerely,          Ty Wilcox MD        CC: No Recipients

## 2024-08-02 ENCOUNTER — TELEPHONE (OUTPATIENT)
Age: 59
End: 2024-08-02

## 2024-08-02 NOTE — TELEPHONE ENCOUNTER
Caller: Mireya 1 [facility]     Doctor/Office: Brenden DUMONT#: NA    What needs to be faxed: Work Letter     ATTN to: Emilie     Fax#: [phone]     Documents were successfully e-faxed 8/2/2024

## 2024-09-11 ENCOUNTER — DOCTOR'S OFFICE (OUTPATIENT)
Dept: URBAN - NONMETROPOLITAN AREA CLINIC 1 | Facility: CLINIC | Age: 59
Setting detail: OPHTHALMOLOGY
End: 2024-09-11
Payer: COMMERCIAL

## 2024-09-11 DIAGNOSIS — H25.13: ICD-10-CM

## 2024-09-11 DIAGNOSIS — H40.013: ICD-10-CM

## 2024-09-11 DIAGNOSIS — H53.021: ICD-10-CM

## 2024-09-11 DIAGNOSIS — H35.353: ICD-10-CM

## 2024-09-11 PROCEDURE — 92133 CPTRZD OPH DX IMG PST SGM ON: CPT | Performed by: OPTOMETRIST

## 2024-09-11 PROCEDURE — 92014 COMPRE OPH EXAM EST PT 1/>: CPT | Performed by: OPTOMETRIST

## 2024-09-11 ASSESSMENT — REFRACTION_MANIFEST
OS_ADD: +2.25
OS_VA1: 20/25+
OD_VA1: 20/40-2
OS_SPHERE: -0.50
OD_CYLINDER: -4.75
OD_ADD: +2.25
OD_VA2: 20/20
OD_AXIS: 070
OS_CYLINDER: -1.75
OU_VA: 20/25
OD_SPHERE: -0.25
OS_VA2: 20/20
OS_AXIS: 085

## 2024-09-11 ASSESSMENT — REFRACTION_AUTOREFRACTION
OS_CYLINDER: -1.50
OD_AXIS: 67
OS_SPHERE: -1.25
OD_CYLINDER: -3.50
OS_AXIS: 111
OD_SPHERE: -0.75

## 2024-09-11 ASSESSMENT — REFRACTION_CURRENTRX
OD_ADD2: +2.25
OD_SPHERE: -0.50
OS_CYLINDER: -1.75
OS_VPRISM_DIRECTION: PROGS
OD_VPRISM_DIRECTION: PROGS
OS_ADD2: +2.25
OD_AXIS: 070
OD_OVR_VA: 20/
OS_AXIS: 085
OD_CYLINDER: -4.75
OS_OVR_VA: 20/
OS_SPHERE: -0.50

## 2024-09-11 ASSESSMENT — TONOMETRY
OD_IOP_MMHG: 20
OS_IOP_MMHG: 20

## 2024-09-11 ASSESSMENT — MACULA - EPIRETINAL MEMBRANE (ERM): OD_ERM: 1+

## 2024-09-11 ASSESSMENT — CONFRONTATIONAL VISUAL FIELD TEST (CVF)
OS_FINDINGS: FULL
OD_FINDINGS: FULL

## 2024-09-11 ASSESSMENT — VISUAL ACUITY
OS_BCVA: 20/40-2
OD_BCVA: 20/25-1

## 2024-09-30 ENCOUNTER — DOCTOR'S OFFICE (OUTPATIENT)
Dept: URBAN - NONMETROPOLITAN AREA CLINIC 1 | Facility: CLINIC | Age: 59
Setting detail: OPHTHALMOLOGY
End: 2024-09-30
Payer: COMMERCIAL

## 2024-09-30 DIAGNOSIS — H33.101: ICD-10-CM

## 2024-09-30 DIAGNOSIS — H40.013: ICD-10-CM

## 2024-09-30 DIAGNOSIS — H35.371: ICD-10-CM

## 2024-09-30 DIAGNOSIS — H35.341: ICD-10-CM

## 2024-09-30 PROBLEM — H52.213: Status: ACTIVE | Noted: 2024-09-11

## 2024-09-30 PROCEDURE — 92134 CPTRZ OPH DX IMG PST SGM RTA: CPT | Performed by: OPHTHALMOLOGY

## 2024-09-30 PROCEDURE — 99214 OFFICE O/P EST MOD 30 MIN: CPT | Performed by: OPHTHALMOLOGY

## 2024-09-30 PROCEDURE — 92202 OPSCPY EXTND ON/MAC DRAW: CPT | Performed by: OPHTHALMOLOGY

## 2024-09-30 ASSESSMENT — REFRACTION_CURRENTRX
OS_OVR_VA: 20/
OD_OVR_VA: 20/
OD_VPRISM_DIRECTION: PROGS
OS_SPHERE: -0.50
OS_VPRISM_DIRECTION: PROGS
OD_SPHERE: -0.50
OS_CYLINDER: -1.75
OD_CYLINDER: -4.75
OD_AXIS: 070
OS_AXIS: 085
OS_ADD2: +2.25
OD_ADD2: +2.25

## 2024-09-30 ASSESSMENT — CONFRONTATIONAL VISUAL FIELD TEST (CVF)
OS_FINDINGS: FULL
OD_FINDINGS: FULL

## 2024-09-30 ASSESSMENT — REFRACTION_MANIFEST
OD_AXIS: 070
OS_CYLINDER: -1.75
OS_AXIS: 085
OS_VA1: 20/25+
OD_ADD: +2.25
OD_SPHERE: -0.25
OS_SPHERE: -0.50
OD_VA2: 20/20
OU_VA: 20/25
OS_ADD: +2.25
OD_CYLINDER: -4.75
OD_VA1: 20/40-2
OS_VA2: 20/20

## 2024-09-30 ASSESSMENT — VISUAL ACUITY
OD_BCVA: 20/25+1
OS_BCVA: 20/40-1

## 2024-10-26 ENCOUNTER — HOSPITAL ENCOUNTER (OUTPATIENT)
Dept: ULTRASOUND IMAGING | Facility: HOSPITAL | Age: 59
Discharge: HOME | End: 2024-10-26
Payer: COMMERCIAL

## 2024-10-26 DIAGNOSIS — E04.1 NONTOXIC SINGLE THYROID NODULE: ICD-10-CM

## 2024-10-26 PROCEDURE — 76536 US EXAM OF HEAD AND NECK: CPT

## 2025-04-25 ENCOUNTER — HOSPITAL ENCOUNTER (OUTPATIENT)
Dept: MRI IMAGING | Facility: HOSPITAL | Age: 60
End: 2025-04-25
Attending: NEUROLOGICAL SURGERY
Payer: COMMERCIAL

## 2025-04-25 DIAGNOSIS — I67.1 INTRACRANIAL ANEURYSM: ICD-10-CM

## 2025-04-25 PROCEDURE — 70544 MR ANGIOGRAPHY HEAD W/O DYE: CPT

## 2025-05-13 NOTE — ASSESSMENT & PLAN NOTE
1 year follow up of an intracranial aneurysm vs infundibulum  Noted on CTA 2/2023 on evaluation of vertigo and dysarthria, no CVA seen  Mother had a ruptured aneurysm.  Former smoker.   Exam: Nonfocal    Imaging:  MRA head 4/25/25: Stable 3 mm outpouching off the supraclinoid segment of the left ICA around the origin of the left posterior communicating artery, which again may represent a small infundibulum versus aneurysm. Recommend continued clinical and imaging surveillance.     Plan:  Reviewed imaging with patient and Dr. Manuel.  Stable possible left PCOM aneurysm vs infundibulum noted.  Discussed natural history of aneurysms and risk of rupture as well as an infundibulum which is an anatomic variant with no risk of rupture.  Discussed genetic component to aneurysms as well as modifiable risk factors such as smoking and HTN.  If this is a true aneurysm, given current size and location, risk of rupture is <1%/year per UCAS and <1%/5yrs per ISIUA.  Discussed screening of her siblings as well as all of their children with MRA head. If negative, can be re-screened every 5 years.  Given stability, follow up in 2 years with MRA head as joint appointment with Dr. Manuel.  Call sooner with any questions or concerns.

## 2025-05-14 ENCOUNTER — OFFICE VISIT (OUTPATIENT)
Dept: NEUROSURGERY | Facility: CLINIC | Age: 60
End: 2025-05-14
Payer: COMMERCIAL

## 2025-05-14 VITALS
DIASTOLIC BLOOD PRESSURE: 68 MMHG | WEIGHT: 190 LBS | HEIGHT: 67 IN | OXYGEN SATURATION: 99 % | SYSTOLIC BLOOD PRESSURE: 118 MMHG | HEART RATE: 67 BPM | BODY MASS INDEX: 29.82 KG/M2

## 2025-05-14 DIAGNOSIS — I67.1 INTRACRANIAL ANEURYSM: Primary | ICD-10-CM

## 2025-05-14 PROCEDURE — 99214 OFFICE O/P EST MOD 30 MIN: CPT | Performed by: PHYSICIAN ASSISTANT

## 2025-05-14 RX ORDER — TIRZEPATIDE 5 MG/.5ML
5 INJECTION, SOLUTION SUBCUTANEOUS WEEKLY
COMMUNITY
Start: 2025-04-21

## 2025-05-14 NOTE — PROGRESS NOTES
Name: Hernan Bridges      : 1965      MRN: [id number]  Encounter Provider: Kuldeep Manuel MD  Encounter Date: 2025   [facility]  :  Assessment & Plan  Intracranial aneurysm  1 year follow up of an intracranial aneurysm vs infundibulum  Noted on CTA 2023 on evaluation of vertigo and dysarthria, no CVA seen  Mother had a ruptured aneurysm.  Former smoker.   Exam: Nonfocal    Imaging:  MRA head 25: Stable 3 mm outpouching off the supraclinoid segment of the left ICA around the origin of the left posterior communicating artery, which again may represent a small infundibulum versus aneurysm. Recommend continued clinical and imaging surveillance.     Plan:  Reviewed imaging with patient and Dr. Manuel.  Stable possible left PCOM aneurysm vs infundibulum noted.  Discussed natural history of aneurysms and risk of rupture as well as an infundibulum which is an anatomic variant with no risk of rupture.  Discussed genetic component to aneurysms as well as modifiable risk factors such as smoking and HTN.  If this is a true aneurysm, given current size and location, risk of rupture is <1%/year per UCAS and <1%/5yrs per ISIUA.  Discussed screening of her siblings as well as all of their children with MRA head. If negative, can be re-screened every 5 years.  Given stability, follow up in 2 years with MRA head as joint appointment with Dr. Manuel.  Call sooner with any questions or concerns.             History of Present Illness     Hernan Bridges is a 60 y.o. 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 This was noted incidentally on evaluation of vertigo and dysarthria in 2023.  No stroke was seen on imaging.  She reports having occasional headaches. No other neurologic complaints for today's visit.  She notes a family history of ruptured aneurysm in her mother.  Former smoker.  BP  controlled.  History of factor V Leiden with recurrent DVTs and PE on Eliquis.    HPI     Review of Systems   Constitutional:  Positive for fatigue. Negative for appetite change.   HENT: Negative.     Eyes: Negative.  Negative for visual disturbance.   Respiratory: Negative.     Cardiovascular: Negative.    Gastrointestinal: Negative.  Negative for nausea and vomiting.   Endocrine: Negative.    Genitourinary: Negative.    Musculoskeletal:  Positive for neck pain.   Skin: Negative.    Allergic/Immunologic: Negative.    Neurological:  Positive for headaches (twice a week, upon awakening). Negative for dizziness, speech difficulty and light-headedness.   Hematological: Negative.    Psychiatric/Behavioral: Negative.  Negative for confusion, hallucinations and sleep disturbance.    All other systems reviewed and are negative.    I have personally reviewed the MA's review of systems and made changes as necessary.    Past Medical History   Past Medical History:   Diagnosis Date    DVT (deep vein thrombosis) in pregnancy     Intracranial aneurysm 2024    Pulmonary embolism (HCC)      Past Surgical History:   Procedure Laterality Date    ABDOMINAL SURGERY  2023    Gastric Sleeve      SECTION       History reviewed. No pertinent family history.  she reports that she quit smoking about 22 years ago. Her smoking use included cigarettes. She has never used smokeless tobacco. She reports that she does not drink alcohol and does not use drugs.  Current Outpatient Medications   Medication Instructions    acetaminophen (TYLENOL) 1,000 mg, Oral, Every 6 hours PRN    acetaminophen (TYLENOL) 325 mg, Daily    albuterol (PROVENTIL HFA,VENTOLIN HFA) 90 mcg/act inhaler 2 puffs, As needed    apixaban (ELIQUIS) 5 mg, 2 times daily    cyclobenzaprine (FLEXERIL) 5 mg, Oral, 2 times daily PRN    docusate sodium (COLACE) 50 mg, 2 times daily    fexofenadine (ALLEGRA) 180 mg, As needed    Mounjaro 5 mg, Weekly    Multiple Vitamin  "(multivitamin) tablet 1 tablet, Daily    omeprazole (PRILOSEC) 20 mg, Daily    oxyCODONE (ROXICODONE) 5 mg, Oral, Every 6 hours PRN    Ozempic (0.25 or 0.5 MG/DOSE) 0.5 mg, Every 7 days    potassium chloride (Klor-Con M10) 10 mEq tablet 20 mEq, Oral, 2 times daily    ursodiol (ACTIGALL) 300 mg, 2 times daily   Allergies[1]   Objective   /68 (BP Location: Left arm, Patient Position: Sitting, Cuff Size: Standard)   Pulse 67   Ht 5' 7\" (1.702 m)   Wt 86.2 kg (190 lb)   SpO2 99%   BMI 29.76 kg/m²     Physical Exam  Vitals reviewed.   Constitutional:       General: She is awake.      Appearance: Normal appearance.   HENT:      Head: Normocephalic and atraumatic.     Eyes:      Extraocular Movements: Extraocular movements intact.      Conjunctiva/sclera: Conjunctivae normal.      Pupils: Pupils are equal, round, and reactive to light.       Cardiovascular:      Rate and Rhythm: Normal rate.   Pulmonary:      Effort: Pulmonary effort is normal.     Skin:     General: Skin is warm and dry.     Neurological:      Mental Status: She is alert.      Deep Tendon Reflexes:      Reflex Scores:       Bicep reflexes are 2+ on the right side and 2+ on the left side.       Brachioradialis reflexes are 2+ on the right side and 2+ on the left side.       Patellar reflexes are 2+ on the right side and 2+ on the left side.    Psychiatric:         Attention and Perception: Attention and perception normal.         Mood and Affect: Mood and affect normal.         Speech: Speech normal.         Behavior: Behavior normal. Behavior is cooperative.         Thought Content: Thought content normal.         Cognition and Memory: Cognition and memory normal.         Judgment: Judgment normal.       Neurological Exam  Mental Status  Awake and alert. Memory is normal. Recent and remote memory are intact. Speech is normal. Language is fluent with no aphasia. Able to perform serial calculations. Fund of knowledge is appropriate for level of " education.    Cranial Nerves  CN III, IV, VI: Extraocular movements intact bilaterally. Pupils equal round and reactive to light bilaterally.  CN V:  Right: Facial sensation is normal.  Left: Facial sensation is normal on the left.  CN VII: Full and symmetric facial movement.  CN VIII: Hearing is normal.  CN XI:  Right: Trapezius strength is normal.  Left: Trapezius strength is normal.  CN XII: Tongue midline without atrophy or fasciculations.    Motor  Normal muscle bulk throughout. Normal muscle tone. No pronator drift.                                             Right                     Left  Hip flexion                              5                          5  Plantarflexion                         5                          5  Dorsiflexion                            5                          5                                             Right                     Left   Biceps                                   5                          5   Triceps                                  5                          5   5/5 bilaterally .    Sensory  Light touch is normal in upper and lower extremities.     Reflexes                                            Right                      Left  Brachioradialis                    2+                         2+  Biceps                                 2+                         2+  Patellar                                2+                         2+    Right pathological reflexes: Alma Delia's absent.  Left pathological reflexes: Alma Delia's absent.    Coordination  Right: Finger-to-nose normal.Left: Finger-to-nose normal.    Gait  Casual gait is normal including stance, stride, and arm swing.                     [1]   Allergies  Allergen Reactions    Medical Tape Rash